# Patient Record
Sex: MALE | URBAN - METROPOLITAN AREA
[De-identification: names, ages, dates, MRNs, and addresses within clinical notes are randomized per-mention and may not be internally consistent; named-entity substitution may affect disease eponyms.]

---

## 2023-12-08 ENCOUNTER — TELEPHONE (OUTPATIENT)
Age: 33
End: 2023-12-08

## 2024-02-20 ENCOUNTER — TELEPHONE (OUTPATIENT)
Dept: FAMILY MEDICINE CLINIC | Facility: CLINIC | Age: 34
End: 2024-02-20

## 2024-02-26 ENCOUNTER — OFFICE VISIT (OUTPATIENT)
Dept: LAB | Facility: HOSPITAL | Age: 34
End: 2024-02-26
Payer: COMMERCIAL

## 2024-02-26 ENCOUNTER — HOSPITAL ENCOUNTER (OUTPATIENT)
Dept: RADIOLOGY | Facility: HOSPITAL | Age: 34
Discharge: HOME | End: 2024-02-26
Payer: COMMERCIAL

## 2024-02-26 DIAGNOSIS — Z01.818 OTHER SPECIFIED PRE-OPERATIVE EXAMINATION: ICD-10-CM

## 2024-02-26 PROCEDURE — 71046 X-RAY EXAM CHEST 2 VIEWS: CPT

## 2024-02-26 PROCEDURE — 93005 ELECTROCARDIOGRAM TRACING: CPT

## 2024-02-27 LAB
ATRIAL RATE: 56 BPM
P AXIS: 33 DEGREES
PR INTERVAL: 160 MS
QRS AXIS: 44 DEGREES
QRSD INTERVAL: 96 MS
QT INTERVAL: 402 MS
QTC INTERVAL: 387 MS
T WAVE AXIS: 26 DEGREES
VENTRICULAR RATE: 56 BPM

## 2024-02-27 PROCEDURE — 93010 ELECTROCARDIOGRAM REPORT: CPT | Performed by: INTERNAL MEDICINE

## 2024-03-12 ENCOUNTER — OFFICE VISIT (OUTPATIENT)
Age: 34
End: 2024-03-12

## 2024-03-12 VITALS
HEIGHT: 69 IN | BODY MASS INDEX: 25.3 KG/M2 | DIASTOLIC BLOOD PRESSURE: 64 MMHG | WEIGHT: 170.8 LBS | HEART RATE: 59 BPM | SYSTOLIC BLOOD PRESSURE: 112 MMHG | TEMPERATURE: 98.1 F | OXYGEN SATURATION: 99 %

## 2024-03-12 DIAGNOSIS — M54.17 LUMBOSACRAL RADICULOPATHY: Chronic | ICD-10-CM

## 2024-03-12 DIAGNOSIS — Z01.818 PREOPERATIVE CLEARANCE: Primary | ICD-10-CM

## 2024-03-12 PROCEDURE — 99213 OFFICE O/P EST LOW 20 MIN: CPT | Performed by: FAMILY MEDICINE

## 2024-03-12 NOTE — PROGRESS NOTES
"PRE-OPERATIVE EXAMINATION  Grabiel Fowler  1990    Grabiel Fowler is a 34 y.o. male with lumbosacral radiculopathy who is planning to undergo \"transforaminal lumbar interbody fusion L5-S1 with placement of cage bone grafting pedicle screws placement discectomy right to left laminotomy facetectomy and all necessary surgeries\" under general by Dr. Nathan [facility] on 3/25/2024.  Patient has not undergone anesthesia in the past to his knowledge.     Pt reports low back pain particularly with activity, not currently having back pain at rest. Reports it all started back in 2022 on ED visit at [facility] and has been ongoing for 2 years. Is currently still working until surgery.    Pt has no other known PMH or PSH.     ROS:   Chest pain: no   Shortness of breath: no  Shortness of breath with exertion: no  Orthopnea: no  Dizziness: no  Unexplained weight change: no    PMH:  CAD: no  HTN: no  CKD: no  DM: no on insulin: no  History of CVA: no     reports that he has never smoked. He has never used smokeless tobacco. He reports current alcohol use. He reports that he does not currently use drugs.    /64 (BP Location: Left arm, Patient Position: Sitting, Cuff Size: Standard)   Pulse 59   Temp 98.1 °F (36.7 °C) (Tympanic)   Ht 5' 9\" (1.753 m)   Wt 77.5 kg (170 lb 12.8 oz)   SpO2 99%   BMI 25.22 kg/m²     Physical Exam  Vitals reviewed.   Constitutional:       General: He is not in acute distress.     Appearance: Normal appearance. He is well-developed.   HENT:      Head: Normocephalic and atraumatic.      Right Ear: External ear normal.      Left Ear: External ear normal.      Nose: Nose normal.      Mouth/Throat:      Mouth: Mucous membranes are moist.      Pharynx: Oropharynx is clear.   Eyes:      Extraocular Movements: Extraocular movements intact.      Conjunctiva/sclera: Conjunctivae normal.   Cardiovascular:      Rate and Rhythm: Regular rhythm. Bradycardia present.      " "Pulses: Normal pulses.      Heart sounds: Normal heart sounds. No murmur heard.     Comments: Sinus bradycardia  Pulmonary:      Effort: Pulmonary effort is normal. No respiratory distress.      Breath sounds: Normal breath sounds. No wheezing or rales.   Abdominal:      Palpations: Abdomen is soft.   Musculoskeletal:         General: No swelling.      Cervical back: Neck supple.   Skin:     General: Skin is warm and dry.      Capillary Refill: Capillary refill takes less than 2 seconds.      Findings: No rash.   Neurological:      General: No focal deficit present.      Mental Status: He is alert. Mental status is at baseline.   Psychiatric:         Mood and Affect: Mood normal.         Behavior: Behavior normal.         Revised Cardiac Risk Index (RCRI) for Pre-Operative Risk   (estimates risk of cardiac complications after noncardiac surgery)    High-risk surgery: No 0 / Yes +1  Intraperitoneal, intrathoracic, suprainguinal vascular  History of ischemic heart disease: No 0 / Yes +1  Hx of MI, (+) exercise test, current chest pain considered due to myocardial ischemia, use of nitrate therapy or ECG with pathological Q waves)  History of CHF: No 0 / Yes +1  Pulmonary edema, B/L rales or S3 gallop; YIN, orthopnea, PND, CXR showing pulmonary vascular redistribution)  History of cerebrovascular disease: No 0 / Yes +1  Prior TIA or stroke  Pre-operative treatment with insulin: No 0 / Yes +1  Pre-operative creatinine >2 mg/dL: No 0 / Yes +1    RCRI Scorin points: Class I Risk, 0.4% Risk of Major Cardiac Event  1 point: Class II Risk, 0.9% Risk of Major Cardiac Event  2 points: Class III Risk, 6.6% Risk of Major Cardiac Event  3 points: Class IV Risk, 11% Risk of Major Cardiac Event  4 points: Class IV Risk, 11% Risk of Major Cardiac Event  5 points: Class IV Risk, 11% Risk of Major Cardiac Event  6 points: Class IV Risk, 11% Risk of Major Cardiac Event    No results found for: \"CREATININE\"    Assessment/Plan "   Grabiel was seen today for establish care.    Diagnoses and all orders for this visit:    Preoperative clearance    Lumbosacral radiculopathy      Lumbosacral radiculopathy  Undergoing surgery with Dr. Marmolejo on 3/25/2024 presenting for preop clearance. Labwork ,EKG, CXR previously completed reviewed. EKG with sinus bradycardia and likely artifact, would not prevent pt from undergoing surgery.   Pt is medically optimized for procedure at this time and can proceed without further work up  Recommend follow up after surgery for annual physical       Recommendations:  Grabiel Fowler is undergoing an elective Moderate Risk surgery. He is RCRI class I risk (0 points) with 0.4% risk for major adverse cardiac event (MACE). He may proceed with surgery as planned without further workup. Pre-operative form completed and faxed today to office as requested. Please print note and labwork/testing including EKG and CXR reads if office requests.     Pt is medically optimized to proceed with surgery.       Nkechi Bansal MD  [facility]  Date: 3/12/2024 Time: 9:17 AM

## 2024-03-12 NOTE — Clinical Note
Form completed and placed in white team completed folder. Please also print and fax today's preop clearance note and labwork results from 2/23/24 if surgery office requests. Thank you

## 2024-03-12 NOTE — ASSESSMENT & PLAN NOTE
Undergoing surgery with Dr. Marmolejo on 3/25/2024 presenting for preop clearance. Labwork, EKG, CXR previously completed reviewed. Labwork including CBC, UA, CMP, PT/PTT WNL and CXR unremarkable. EKG with sinus bradycardia and likely artifact, would not prevent pt from undergoing surgery.   Pt is medically optimized for procedure at this time and can proceed without further work up  Recommend follow up after surgery for annual physical

## 2024-03-12 NOTE — PATIENT INSTRUCTIONS
Lumbar Radiculopathy   AMBULATORY CARE:   Lumbar radiculopathy  is a painful condition that happens when a nerve in your lumbar spine (lower back) is pinched or irritated.       Common signs and symptoms of lumbar radiculopathy:   Pain that moves from your lower back to your buttocks, groin, and the back of your leg    Pain felt below your knee    Pain that worsens when you cough, sneeze, stand, or sit    Numbness, weakness, or tingling in your back or legs    Seek immediate care if:   You have a fever higher than 100.4°F (38°C) for longer than 2 days.    You have new, severe back or leg pain, or your pain spreads to both legs.    You have any new signs of numbness or weakness, especially in your lower back, legs, arms, or genital area.    You have new trouble controlling your urine and bowel movements.    You do not feel like your bladder empties when you urinate.    Call your doctor or spine specialist if:   Your pain does not improve within 1 to 3 weeks of treatment.    Your pain and weakness keep you from your normal activities at work, home, or school.    You lose more than 10 pounds in 6 months without trying.    You become depressed or sad because of the pain.    You have questions or concerns about your condition or care.    Treatment:  Ask your healthcare provider for more information about these and other treatments for lumbar radiculopathy:  Medicines:      NSAIDs , such as ibuprofen, help decrease swelling, pain, and fever. This medicine is available with or without a doctor's order. NSAIDs can cause stomach bleeding or kidney problems in certain people. If you take blood thinner medicine, always ask your healthcare provider if NSAIDs are safe for you. Always read the medicine label and follow directions.    Muscle relaxers  help decrease pain and muscle spasms.    Prescription pain medicine  may be given. Ask your healthcare provider how to take this medicine safely. Some prescription pain medicines  contain acetaminophen. Do not take other medicines that contain acetaminophen without talking to your healthcare provider. Too much acetaminophen may cause liver damage. Prescription pain medicine may cause constipation. Ask your healthcare provider how to prevent or treat constipation.     Steroid pills  may help reduce swelling and pain.    Steroid injections  into your lumbar spine may help decrease your nerve pain and swelling. You may need more than 1 injection if your symptoms do not improve after the first treatment.    Physical therapy  may be used to improve your posture (the way you stand and sit), flexibility, and the strength in your lower back. Your physical therapist may also teach you how to remain safely active and prevent more injury.    Transcutaneous electrical nerve stimulation (TENS)  stimulates nerves and may decrease your pain. Wires are attached to pads. The pads are attached to your skin. The wires send a mild current through your nerves.    Surgery  may relieve your pinched nerve if your condition has not improved within 4 to 6 weeks. You may also need surgery if you have lumbar radiculopathy more than 1 time.    Physical therapy  may help improve your posture (the way you stand and sit), flexibility, and the strength in your lower back. Your physical therapist may also teach you how to remain safely active and prevent more injury.  Manage or prevent lumbar radiculopathy:   Apply ice or heat.  Ice and heat can help relieve pain or swelling. Put ice in a plastic bag covered with a towel on your low back. Apply ice for 15 to 20 minutes at a time, or as directed. Cover heated items with a towel to avoid burns. You can also use a heating pad on a low setting. Apply heat for 20 minutes at a time. Your provider may tell you to alternate ice and heat.    Stay active.  Your healthcare provider may tell you to take walks to ease yourself back into your daily routine. Avoid long periods of bed rest.  Bed rest could worsen your symptoms. Do not move in ways that increase your pain. Ask your healthcare provider for more information about the best ways to stay active.         Do not lift anything heavy.  Heavy objects put a strain on your back and may make your symptoms worse.    Maintain a healthy weight.  Extra body weight may strain your back. Ask your provider what a healthy weight is for you. Your provider can help you create a safe weight loss plan, if needed.    Do not smoke.  Nicotine and other chemicals in cigarettes and cigars increase your risk for lumbar radiculopathy. Ask your provider for information if you currently smoke and need help to quit. E-cigarettes and smokeless tobacco still contain nicotine. Talk to your healthcare provider before you use these products.    Follow up with your doctor or spine specialist within 1 to 3 weeks:  After your first follow-up appointment, follow up every 2 weeks until you have healed, or as directed. Write down your questions so you remember to ask them during your visits.  © Copyright [employer] 2023 Information is for End User's use only and may not be sold, redistributed or otherwise used for commercial purposes.  The above information is an  only. It is not intended as medical advice for individual conditions or treatments. Talk to your doctor, nurse or pharmacist before following any medical regimen to see if it is safe and effective for you.

## 2024-08-05 ENCOUNTER — HOSPITAL ENCOUNTER (OUTPATIENT)
Dept: RADIOLOGY | Facility: HOSPITAL | Age: 34
Discharge: HOME | End: 2024-08-05
Payer: COMMERCIAL

## 2024-08-05 DIAGNOSIS — M54.50 LOW BACK PAIN, UNSPECIFIED BACK PAIN LATERALITY, UNSPECIFIED CHRONICITY, UNSPECIFIED WHETHER SCIATICA PRESENT: ICD-10-CM

## 2024-08-05 DIAGNOSIS — M54.9 SPINE PAIN: ICD-10-CM

## 2024-08-05 DIAGNOSIS — Z98.1 S/P LUMBAR FUSION: ICD-10-CM

## 2024-08-05 PROCEDURE — 72120 X-RAY BEND ONLY L-S SPINE: CPT

## 2024-08-28 ENCOUNTER — APPOINTMENT (OUTPATIENT)
Dept: LAB | Facility: HOSPITAL | Age: 34
End: 2024-08-28
Payer: COMMERCIAL

## 2024-08-28 ENCOUNTER — HOSPITAL ENCOUNTER (OUTPATIENT)
Dept: RADIOLOGY | Facility: HOSPITAL | Age: 34
Discharge: HOME | End: 2024-08-28
Payer: COMMERCIAL

## 2024-08-28 DIAGNOSIS — M54.16 LUMBAR RADICULOPATHY: ICD-10-CM

## 2024-08-28 DIAGNOSIS — M54.50 LOW BACK PAIN, UNSPECIFIED BACK PAIN LATERALITY, UNSPECIFIED CHRONICITY, UNSPECIFIED WHETHER SCIATICA PRESENT: ICD-10-CM

## 2024-08-28 DIAGNOSIS — M48.062 PSEUDOCLAUDICATION SYNDROME: ICD-10-CM

## 2024-08-28 LAB
ALBUMIN SERPL BCG-MCNC: 4.6 G/DL (ref 3.5–5)
ALP SERPL-CCNC: 61 U/L (ref 34–104)
ALT SERPL W P-5'-P-CCNC: 26 U/L (ref 7–52)
ANION GAP SERPL CALCULATED.3IONS-SCNC: 5 MMOL/L (ref 4–13)
APTT PPP: 27 SECONDS (ref 23–34)
AST SERPL W P-5'-P-CCNC: 16 U/L (ref 13–39)
ATRIAL RATE: 63 BPM
BACTERIA UR QL AUTO: ABNORMAL /HPF
BASOPHILS # BLD AUTO: 0.04 THOUSANDS/ÂΜL (ref 0–0.1)
BASOPHILS NFR BLD AUTO: 1 % (ref 0–1)
BILIRUB SERPL-MCNC: 1.23 MG/DL (ref 0.2–1)
BILIRUB UR QL STRIP: NEGATIVE
BUN SERPL-MCNC: 18 MG/DL (ref 5–25)
CALCIUM SERPL-MCNC: 9.6 MG/DL (ref 8.4–10.2)
CHLORIDE SERPL-SCNC: 105 MMOL/L (ref 96–108)
CLARITY UR: CLEAR
CO2 SERPL-SCNC: 28 MMOL/L (ref 21–32)
COLOR UR: YELLOW
CREAT SERPL-MCNC: 0.96 MG/DL (ref 0.6–1.3)
EOSINOPHIL # BLD AUTO: 0.38 THOUSAND/ÂΜL (ref 0–0.61)
EOSINOPHIL NFR BLD AUTO: 6 % (ref 0–6)
ERYTHROCYTE [DISTWIDTH] IN BLOOD BY AUTOMATED COUNT: 12.5 % (ref 11.6–15.1)
GFR SERPL CREATININE-BSD FRML MDRD: 102 ML/MIN/1.73SQ M
GLUCOSE SERPL-MCNC: 88 MG/DL (ref 65–140)
GLUCOSE UR STRIP-MCNC: NEGATIVE MG/DL
HCT VFR BLD AUTO: 41 % (ref 36.5–49.3)
HGB BLD-MCNC: 14.1 G/DL (ref 12–17)
HGB UR QL STRIP.AUTO: NEGATIVE
IMM GRANULOCYTES # BLD AUTO: 0.02 THOUSAND/UL (ref 0–0.2)
IMM GRANULOCYTES NFR BLD AUTO: 0 % (ref 0–2)
INR PPP: 0.98 (ref 0.85–1.19)
KETONES UR STRIP-MCNC: NEGATIVE MG/DL
LEUKOCYTE ESTERASE UR QL STRIP: NEGATIVE
LYMPHOCYTES # BLD AUTO: 2.39 THOUSANDS/ÂΜL (ref 0.6–4.47)
LYMPHOCYTES NFR BLD AUTO: 36 % (ref 14–44)
MCH RBC QN AUTO: 29.7 PG (ref 26.8–34.3)
MCHC RBC AUTO-ENTMCNC: 34.4 G/DL (ref 31.4–37.4)
MCV RBC AUTO: 87 FL (ref 82–98)
MONOCYTES # BLD AUTO: 0.57 THOUSAND/ÂΜL (ref 0.17–1.22)
MONOCYTES NFR BLD AUTO: 9 % (ref 4–12)
NEUTROPHILS # BLD AUTO: 3.34 THOUSANDS/ÂΜL (ref 1.85–7.62)
NEUTS SEG NFR BLD AUTO: 48 % (ref 43–75)
NITRITE UR QL STRIP: NEGATIVE
NON-SQ EPI CELLS URNS QL MICRO: ABNORMAL /HPF
NRBC BLD AUTO-RTO: 0 /100 WBCS
P AXIS: 27 DEGREES
PH UR STRIP.AUTO: 5.5 [PH]
PLATELET # BLD AUTO: 292 THOUSANDS/UL (ref 149–390)
PMV BLD AUTO: 9.5 FL (ref 8.9–12.7)
POTASSIUM SERPL-SCNC: 4.1 MMOL/L (ref 3.5–5.3)
PR INTERVAL: 156 MS
PROT SERPL-MCNC: 7.4 G/DL (ref 6.4–8.4)
PROT UR STRIP-MCNC: NEGATIVE MG/DL
PROTHROMBIN TIME: 13.5 SECONDS (ref 12.3–15)
QRS AXIS: 7 DEGREES
QRSD INTERVAL: 90 MS
QT INTERVAL: 376 MS
QTC INTERVAL: 384 MS
RBC # BLD AUTO: 4.74 MILLION/UL (ref 3.88–5.62)
RBC #/AREA URNS AUTO: ABNORMAL /HPF
SODIUM SERPL-SCNC: 138 MMOL/L (ref 135–147)
SP GR UR STRIP.AUTO: 1.02 (ref 1–1.03)
T WAVE AXIS: 6 DEGREES
UROBILINOGEN UR STRIP-ACNC: <2 MG/DL
VENTRICULAR RATE: 63 BPM
WBC # BLD AUTO: 6.74 THOUSAND/UL (ref 4.31–10.16)
WBC #/AREA URNS AUTO: ABNORMAL /HPF

## 2024-08-28 PROCEDURE — 85730 THROMBOPLASTIN TIME PARTIAL: CPT

## 2024-08-28 PROCEDURE — 93010 ELECTROCARDIOGRAM REPORT: CPT | Performed by: INTERNAL MEDICINE

## 2024-08-28 PROCEDURE — 81001 URINALYSIS AUTO W/SCOPE: CPT

## 2024-08-28 PROCEDURE — 71046 X-RAY EXAM CHEST 2 VIEWS: CPT

## 2024-08-28 PROCEDURE — 85610 PROTHROMBIN TIME: CPT

## 2024-08-28 PROCEDURE — 85025 COMPLETE CBC W/AUTO DIFF WBC: CPT

## 2024-08-28 PROCEDURE — 36415 COLL VENOUS BLD VENIPUNCTURE: CPT

## 2024-08-28 PROCEDURE — 80053 COMPREHEN METABOLIC PANEL: CPT

## 2024-08-28 PROCEDURE — 93005 ELECTROCARDIOGRAM TRACING: CPT

## 2024-09-09 NOTE — PROGRESS NOTES
[facility] PRE-OPERATIVE EVALUATION  [facility] PHYSICIAN GROUP - [facility]    NAME: Grabiel Fowler  AGE: 34 y.o. SEX: male  : 1990     DATE: 9/10/2024    [facility] Pre-Operative Evaluation      Chief Complaint: Pre-operative Evaluation     Surgery: revision of hardware L5-S1 with replacement of pedicle screws, rods and all necessary surgeries.   Anticipated Date of Surgery: 24  Referring Provider: Self, Referral       History of Present Illness:     Grabiel Fowler is a 34 y.o. male who presents to the office today for a preoperative consultation at the request of surgeon, Ezequiel Chang M.D. , who plans on performing revision of hardware L5-S1 with replacement of pedicle screws, rods and all necessary surgeries. on 2024. Planned anesthesia is general. Patient has a bleeding risk of: no recent abnormal bleeding. Patient does not have objections to receiving blood products if needed. Current anti-platelet/anti-coagulation medications that the patient is prescribed includes:  None . He is not taking any OTC medications for pain. He does take a daily mutivitamin.     He is a  and had an injury that continued to get worse. He had a surgery in 2024. After that surgery his sciatic improved, however his surgical pain was taking longer to resolve. Imaging in 2024 showed that one of the screws were loose and he needs to go for revision of surgery.          Assessment of Chronic Conditions:   - No chronic medical conditions.      Assessment of Cardiac Risk:  Denies unstable or severe angina or MI in the last 6 weeks or history of stent placement in the last year   Denies decompensated heart failure (e.g. New onset heart failure, NYHA functional class IV heart failure, or worsening existing heart failure)  Denies significant arrhythmias such as high grade AV block, symptomatic ventricular arrhythmia, newly recognized ventricular  tachycardia, supraventricular tachycardia with resting heart rate >100, or symptomatic bradycardia  Denies severe heart valve disease including aortic stenosis or symptomatic mitral stenosis     Exercise Capacity:  Able to walk 4 blocks without symptoms?: Yes  Able to walk 2 flights without symptoms?: Yes    Prior Anesthesia Reactions: No, though did take a while to come out of anesthesia last surgery, and required an over night admission.      Personal history of venous thromboembolic disease? No    History of steroid use for >2 weeks within last year? No         Review of Systems:     Review of Systems   Constitutional:  Negative for activity change, appetite change, chills, fatigue and fever.   HENT:  Negative for congestion and sore throat.    Eyes:  Negative for visual disturbance.   Respiratory:  Negative for cough, shortness of breath and wheezing.    Cardiovascular:  Negative for chest pain, palpitations and leg swelling.   Gastrointestinal:  Negative for abdominal pain, constipation, diarrhea, nausea and vomiting.   Genitourinary:  Negative for difficulty urinating, dysuria and hematuria.   Musculoskeletal:  Positive for back pain. Negative for arthralgias.   Skin:  Negative for color change and rash.   Neurological:  Negative for dizziness, seizures, syncope, weakness, light-headedness, numbness and headaches.   Psychiatric/Behavioral:  Positive for dysphoric mood. Negative for agitation, behavioral problems, self-injury, sleep disturbance and suicidal ideas. The patient is not nervous/anxious and is not hyperactive.    All other systems reviewed and are negative.      Current Problem List:     Patient Active Problem List   Diagnosis    Lumbosacral radiculopathy    Adjustment disorder with depressed mood       Allergies:     No Known Allergies    Current Medications:       Current Outpatient Medications:     multivitamin (THERAGRAN) TABS, Take 1 tablet by mouth daily, Disp: , Rfl:     Past Medical History:  "      History reviewed. No pertinent past medical history.     Past Surgical History:   Procedure Laterality Date    SPINE SURGERY  03/2024        Family History   Problem Relation Age of Onset    Diabetes type II Father         Social History     Socioeconomic History    Marital status: Single     Spouse name: Not on file    Number of children: Not on file    Years of education: Not on file    Highest education level: Not on file   Occupational History    Not on file   Tobacco Use    Smoking status: Never    Smokeless tobacco: Never   Vaping Use    Vaping status: Never Used   Substance and Sexual Activity    Alcohol use: Yes     Comment: socially    Drug use: Not Currently    Sexual activity: Not on file   Other Topics Concern    Not on file   Social History Narrative    Not on file     Social Determinants of Health     Financial Resource Strain: Low Risk  (3/12/2024)    Overall Financial Resource Strain (CARDIA)     Difficulty of Paying Living Expenses: Not very hard   Food Insecurity: No Food Insecurity (3/12/2024)    Hunger Vital Sign     Worried About Running Out of Food in the Last Year: Never true     Ran Out of Food in the Last Year: Never true   Transportation Needs: No Transportation Needs (3/12/2024)    PRAPARE - Transportation     Lack of Transportation (Medical): No     Lack of Transportation (Non-Medical): No   Physical Activity: Not on file   Stress: Not on file   Social Connections: Not on file   Intimate Partner Violence: Not on file   Housing Stability: Low Risk  (3/12/2024)    Housing Stability Vital Sign     Unable to Pay for Housing in the Last Year: No     Number of Times Moved in the Last Year: 1     Homeless in the Last Year: No        Physical Exam:     /86 (BP Location: Left arm, Patient Position: Sitting, Cuff Size: Standard)   Pulse 70   Temp 97.9 °F (36.6 °C) (Tympanic)   Ht 5' 9\" (1.753 m)   Wt 83.1 kg (183 lb 4.8 oz)   SpO2 100%   BMI 27.07 kg/m²     Physical Exam  Vitals " and nursing note reviewed.   Constitutional:       General: He is not in acute distress.     Appearance: Normal appearance. He is well-developed.   HENT:      Head: Normocephalic and atraumatic.      Right Ear: External ear normal.      Left Ear: External ear normal.      Nose: No congestion.      Mouth/Throat:      Mouth: Mucous membranes are moist.      Pharynx: Oropharynx is clear. No posterior oropharyngeal erythema or postnasal drip.      Tonsils: No tonsillar exudate or tonsillar abscesses. 2+ on the right. 2+ on the left.   Eyes:      Extraocular Movements: Extraocular movements intact and EOM normal.      Conjunctiva/sclera: Conjunctivae normal.   Cardiovascular:      Rate and Rhythm: Normal rate and regular rhythm.      Pulses: Normal pulses.      Heart sounds: Normal heart sounds. No murmur heard.  Pulmonary:      Effort: Pulmonary effort is normal. No respiratory distress.      Breath sounds: Normal breath sounds. No wheezing, rhonchi or rales.   Abdominal:      General: There is no distension.      Palpations: Abdomen is soft.   Musculoskeletal:         General: Normal range of motion.      Cervical back: Normal, normal range of motion and neck supple.      Thoracic back: Normal.      Lumbar back: Tenderness (left lumbrosacral region had mild tenderness on palpation just inferior to distal edge of scar) present. No bony tenderness.      Right lower leg: No edema.      Left lower leg: No edema.   Skin:     General: Skin is warm and dry.      Capillary Refill: Capillary refill takes less than 2 seconds.      Comments: Vertical 3 inch scar well healed on left lateral paraspinal lumbar region.    Neurological:      Mental Status: He is alert and oriented to person, place, and time.      Motor: No weakness.      Gait: Gait normal.   Psychiatric:         Mood and Affect: Mood and affect and mood normal.         Behavior: Behavior normal.          Data:     Pre-operative work-up    Laboratory Results: I have  personally reviewed the pertinent laboratory results/reports      EKG/Chest x-ray:: Personally reviewed the following image studies in PACS and associated radiology reports: chest xray and xray(s). My interpretation of the radiology images/reports is: wnl for the cxr, lumbar spine xray shows abnormality of superior hardware. EKG reviewed and wnl. Shows NSR with no ST or T wave abnormalities.              Assessment & Recommendations:     Problem List Items Addressed This Visit          Nervous and Auditory    Lumbosacral radiculopathy - Primary (Chronic)     Revision surgery next week  All labs, EKG, imaging reviewed  No special requests or requirements for planned surgery  Patient is low risk for surgical complications and medically optimized to proceed with planned surgery at this time.            Behavioral Health    Adjustment disorder with depressed mood     PHQ 2 was a 2, however patient does show clinical signs of adjustment disorder with depressed mood.  Since his surgery he has not been working for about 6 months.  He feels a little down and depressed a few days of the week.  He denies any suicidal ideation, self-harm, homicidal ideation.  Patient is not a candidate for medication at this time as he is going for next week, and would like to avoid starting any new medications.  Though we did discuss this is a possible option.  Do recommend patient to set up outpatient behavioral health therapy.  Referral placed to social work.  Will have patient follow-up in about 6 to 8 weeks after his surgery to see how he is adjusting, and we can provide additional supports in relation to his adjustment disorder.         Relevant Orders    Ambulatory Referral to Social Work Care Management Program       Pre-Op Evaluation Assessment  34 y.o. male with planned surgery: Revision of hardware L5-S1 with replacement of pedicle screws, rods and all necessary surgeries..    Known risk factors for perioperative complications:  None.        Current medications which may produce withdrawal symptoms if withheld perioperatively: None.    Pre-Op Evaluation Plan  1. Further preoperative workup as follows:   - None; no further preoperative work-up is required    2. Medication Management/Recommendations:   - None, continue medication regimen including morning of surgery, with sip of water    3. Prophylaxis for cardiac events with perioperative beta-blockers: not indicated.    4. Patient requires further consultation with: None    Clearance  Patient is CLEARED for surgery without any additional cardiac testing.     Rylee Mullen MD  [address] [address]  Phone#  [phone]  Fax#  [phone]

## 2024-09-10 ENCOUNTER — OFFICE VISIT (OUTPATIENT)
Age: 34
End: 2024-09-10

## 2024-09-10 ENCOUNTER — TELEPHONE (OUTPATIENT)
Age: 34
End: 2024-09-10

## 2024-09-10 VITALS
HEIGHT: 69 IN | SYSTOLIC BLOOD PRESSURE: 140 MMHG | DIASTOLIC BLOOD PRESSURE: 86 MMHG | WEIGHT: 183.3 LBS | OXYGEN SATURATION: 100 % | HEART RATE: 70 BPM | BODY MASS INDEX: 27.15 KG/M2 | TEMPERATURE: 97.9 F

## 2024-09-10 DIAGNOSIS — M54.17 LUMBOSACRAL RADICULOPATHY: Primary | Chronic | ICD-10-CM

## 2024-09-10 DIAGNOSIS — F43.21 ADJUSTMENT DISORDER WITH DEPRESSED MOOD: ICD-10-CM

## 2024-09-10 PROCEDURE — 99214 OFFICE O/P EST MOD 30 MIN: CPT | Performed by: FAMILY MEDICINE

## 2024-09-10 RX ORDER — DIPHENOXYLATE HYDROCHLORIDE AND ATROPINE SULFATE 2.5; .025 MG/1; MG/1
1 TABLET ORAL DAILY
COMMUNITY

## 2024-09-10 NOTE — TELEPHONE ENCOUNTER
A user error has taken place: encounter opened in error, closed for administrative reasons.     Printed out office note, labs, EKG and chest x ray  Faxed to office

## 2024-09-10 NOTE — ASSESSMENT & PLAN NOTE
Revision surgery next week  All labs, EKG, imaging reviewed  No special requests or requirements for planned surgery  Patient is low risk for surgical complications and medically optimized to proceed with planned surgery at this time.

## 2024-09-10 NOTE — TELEPHONE ENCOUNTER
----- Message from Rylee Mullen MD sent at 9/10/2024  8:53 AM EDT -----  Regarding: pre op clearance paperwork  Good morning,  Patient was seen in the office today for preop clearance.  I placed a copy of the form from the surgeons office and the blue team folder.  Can you please fax this along with the note from today, his recent blood work from 8/28, EKG results, and chest x-ray to the surgery's office.  Me know if you have any questions.    Kind regards,    Dr. Mullen

## 2024-09-10 NOTE — ASSESSMENT & PLAN NOTE
PHQ 2 was a 2, however patient does show clinical signs of adjustment disorder with depressed mood.  Since his surgery he has not been working for about 6 months.  He feels a little down and depressed a few days of the week.  He denies any suicidal ideation, self-harm, homicidal ideation.  Patient is not a candidate for medication at this time as he is going for next week, and would like to avoid starting any new medications.  Though we did discuss this is a possible option.  Do recommend patient to set up outpatient behavioral health therapy.  Referral placed to social work.  Will have patient follow-up in about 6 to 8 weeks after his surgery to see how he is adjusting, and we can provide additional supports in relation to his adjustment disorder.

## 2024-09-16 ENCOUNTER — PATIENT OUTREACH (OUTPATIENT)
Age: 34
End: 2024-09-16

## 2024-09-16 NOTE — PROGRESS NOTES
OP TERRI had received a referral from Rylee Mullen MD r/t . OP TERRI had completed a chart review. Per chart, patient had surgery and has not been working for 6 months. Per chart, patient feeling down and depressed. Per chart, patient needs MH services.    OP TERRI had called the patient via phone. OP SW left a voicemail. OP TERRI had received a call back from the patient. OP SW will attempt to call again at a late date and time. OP TERRI will continue to be available.

## 2024-09-19 ENCOUNTER — PATIENT OUTREACH (OUTPATIENT)
Age: 34
End: 2024-09-19

## 2024-09-19 NOTE — PROGRESS NOTES
OP TERRI had called the patient via phone. OP TERRI left a voicemail. OP TERRI notes this is the second phone call attempt. OP TERRI sent unable to reach letter via P21t. OP TERRI closed referral. Please reconsult TERRI for future needs.

## 2024-09-19 NOTE — LETTER
09/19/24    Dear Grabiel Folwer,    I tried to reach you by phone and was unfortunately unable to reach you. I am the Outpatient Care Manager -  from [facility]. I am following up on a referral placed by your PCP. Please give me a call at [phone] from 8am-4:30pm, Mon-Fri.    Sincerely,         NENO Sanchez

## 2024-11-12 ENCOUNTER — OFFICE VISIT (OUTPATIENT)
Age: 34
End: 2024-11-12

## 2024-11-12 VITALS
OXYGEN SATURATION: 98 % | HEART RATE: 75 BPM | BODY MASS INDEX: 28.29 KG/M2 | TEMPERATURE: 97.7 F | DIASTOLIC BLOOD PRESSURE: 80 MMHG | WEIGHT: 191 LBS | HEIGHT: 69 IN | SYSTOLIC BLOOD PRESSURE: 124 MMHG

## 2024-11-12 DIAGNOSIS — Z23 ENCOUNTER FOR IMMUNIZATION: ICD-10-CM

## 2024-11-12 DIAGNOSIS — F43.21 ADJUSTMENT DISORDER WITH DEPRESSED MOOD: Primary | ICD-10-CM

## 2024-11-12 DIAGNOSIS — M54.17 LUMBOSACRAL RADICULOPATHY: Chronic | ICD-10-CM

## 2024-11-12 PROCEDURE — 99214 OFFICE O/P EST MOD 30 MIN: CPT | Performed by: FAMILY MEDICINE

## 2024-11-12 NOTE — ASSESSMENT & PLAN NOTE
Last visit showed clinical signs of adjustment disorder with depressed mood.  Secondary to back injury requiring surgery and time off of work.   Has since had his second back surgery and is feeling much better. Declines any signs or symptoms of depressed mood at this time. No longer requires BHT.  He denies any suicidal ideation, self-harm, homicidal ideation.

## 2024-11-12 NOTE — ASSESSMENT & PLAN NOTE
Revision surgery went well and reports symptoms have resolved. Has apt with vikas on 11/20/24 to discuss starting physical therapy. He will be off of work for 3 more months.

## 2024-11-12 NOTE — PROGRESS NOTES
Ambulatory Visit  Name: Grabiel Fowler      : 1990      MRN: [id number]  Encounter Provider: Rylee Mullen MD  Encounter Date: 2024   Encounter department: [facility]    Assessment & Plan  Adjustment disorder with depressed mood  Last visit showed clinical signs of adjustment disorder with depressed mood.  Secondary to back injury requiring surgery and time off of work.   Has since had his second back surgery and is feeling much better. Declines any signs or symptoms of depressed mood at this time. No longer requires BHT.  He denies any suicidal ideation, self-harm, homicidal ideation.           Lumbosacral radiculopathy  Revision surgery went well and reports symptoms have resolved. Has apt with vikas on 24 to discuss starting physical therapy. He will be off of work for 3 more months.          Encounter for immunization  Declined           History of Present Illness     This is a very pleasant 34 y.o. male who presents to the clinic for management of adjustment disorder secondary to physical condition.   Patient's medical conditions are stable unless noted otherwise above.  Patient has not had any recent hospitalizations, or medical emergencies since last visit.  Patient has no further complaints other than what is mentioned in the ROS.              Review of Systems   Constitutional:  Negative for activity change, appetite change, chills, fatigue and fever.   HENT:  Negative for congestion and sore throat.    Eyes:  Negative for visual disturbance.   Respiratory:  Negative for cough, shortness of breath and wheezing.    Cardiovascular:  Negative for chest pain, palpitations and leg swelling.   Gastrointestinal:  Negative for abdominal pain, constipation, diarrhea, nausea and vomiting.   Genitourinary:  Negative for difficulty urinating, dysuria and hematuria.   Musculoskeletal:  Negative for arthralgias and back pain.   Skin:  Negative  "for color change and rash.   Neurological:  Negative for dizziness, seizures, syncope, weakness, light-headedness, numbness and headaches.   Psychiatric/Behavioral:  Negative for agitation, behavioral problems, dysphoric mood, self-injury, sleep disturbance and suicidal ideas. The patient is not nervous/anxious and is not hyperactive.    All other systems reviewed and are negative.          Objective     /80 (BP Location: Left arm, Patient Position: Sitting, Cuff Size: Standard)   Pulse 75   Temp 97.7 °F (36.5 °C) (Tympanic)   Ht 5' 9\" (1.753 m)   Wt 86.6 kg (191 lb)   SpO2 98%   BMI 28.21 kg/m²     Physical Exam  Vitals reviewed.   Constitutional:       General: He is not in acute distress.     Appearance: Normal appearance. He is well-developed.   HENT:      Head: Normocephalic and atraumatic.      Right Ear: External ear normal.      Left Ear: External ear normal.      Nose: Nose normal.      Mouth/Throat:      Mouth: Mucous membranes are moist.      Pharynx: Oropharynx is clear.   Eyes:      Extraocular Movements: Extraocular movements intact.      Conjunctiva/sclera: Conjunctivae normal.   Cardiovascular:      Rate and Rhythm: Normal rate and regular rhythm.      Pulses: Normal pulses.      Heart sounds: Normal heart sounds. No murmur heard.  Pulmonary:      Effort: Pulmonary effort is normal. No respiratory distress.      Breath sounds: Normal breath sounds. No wheezing or rales.   Abdominal:      Palpations: Abdomen is soft.   Musculoskeletal:         General: No swelling.      Cervical back: Neck supple.   Skin:     General: Skin is warm and dry.      Capillary Refill: Capillary refill takes less than 2 seconds.      Findings: No rash.      Comments: Vertical 3 inch scar well healed on left lateral paraspinal lumbar region. Skin discoloration superior and inferior to scar that correlates with previous skin reaction to bandage post operatively.     Neurological:      General: No focal deficit " present.      Mental Status: He is alert. Mental status is at baseline.   Psychiatric:         Mood and Affect: Mood normal.         Behavior: Behavior normal.

## 2024-12-02 ENCOUNTER — EVALUATION (OUTPATIENT)
Dept: PHYSICAL THERAPY | Facility: CLINIC | Age: 34
End: 2024-12-02
Payer: COMMERCIAL

## 2024-12-02 DIAGNOSIS — Z98.1 S/P LUMBAR FUSION: Primary | ICD-10-CM

## 2024-12-02 PROCEDURE — 97161 PT EVAL LOW COMPLEX 20 MIN: CPT | Performed by: PHYSICAL THERAPIST

## 2024-12-02 NOTE — LETTER
2024    Nathan Marmolejo DO  [address] [address]    Patient: Grabiel Fowler   YOB: 1990   Date of Visit: 2024     Encounter Diagnosis     ICD-10-CM    1. S/P lumbar fusion  Z98.1           Dear Dr. Marmolejo:    Thank you for your recent referral of Grabiel Fowler. Please review the attached evaluation summary from Grabiel's recent visit.     Please verify that you agree with the plan of care by signing the attached order.     If you have any questions or concerns, please do not hesitate to call.     I sincerely appreciate the opportunity to share in the care of one of your patients and hope to have another opportunity to work with you in the near future.       Sincerely,    Maulik Mirza, PT      Referring Provider:      I certify that I have read the below Plan of Care and certify the need for these services furnished under this plan of treatment while under my care.                    Nathan Marmolejo DO  [address] [address]  Via Fax: [phone]          PT Evaluation     Today's date: 2024  Patient name: Grabiel Fowler  : 1990  MRN: [id number]  Referring provider: Nathan Marmolejo DO  Dx:   Encounter Diagnosis     ICD-10-CM    1. S/P lumbar fusion  Z98.1           Start Time: 1500  Stop Time: 1545  Total time in clinic (min): 45 minutes    Assessment  Impairments: abnormal muscle firing, abnormal muscle tone, abnormal or restricted ROM, abnormal movement, impaired physical strength, lacks appropriate home exercise program, pain with function, poor posture  and poor body mechanics  Symptom irritability: low    Assessment details: Grabiel Fowler is a 34 y.o. male who presents with complaints of S/P lumbar fusion  (primary encounter diagnosis).  No further referral appears necessary at this time based upon examination results.  Patient is presenting with decreased ROM and strength leading to limitations  with working as  and performing ADLs around the house. Prognosis is good given HEP compliance and PT 1-2x/wk.  Positive prognostic indicators include positive attitude toward recovery.   Please contact me if you have any questions or recommendations.  Thank you for the opportunity to share in Grabiel's care.       Understanding of Dx/Px/POC: good     Prognosis: good    Plan  Patient would benefit from: skilled physical therapy    Planned therapy interventions: joint mobilization, manual therapy, patient education, postural training, strengthening, stretching, therapeutic activities, therapeutic exercise, home exercise program, neuromuscular re-education, flexibility, functional ROM exercises, abdominal trunk stabilization and body mechanics training    Frequency: 1-2x week  Duration in weeks: 8  Treatment plan discussed with: patient        Subjective Evaluation    History of Present Illness  Date of surgery: 10/10/2024  Mechanism of injury: Patient presenting post op L5/S1 do to hardware failure. No longer having sciatic symptoms. Patient denies bowel/bladder dysfunction, saddle paraesthesias, but has cough/sneeze provocation in his back. Was recommended to start therapy and will be following up with physician later this month. Patient works as  and would like to return to work. Patient has been avoiding bending, twisting, and lifting.               Recurrent probem    Quality of life: good    Patient Goals  Patient goals for therapy: increased motion, decreased pain, increased strength and return to work    Pain  Current pain ratin  At worst pain ratin        Short Term:  Pt will report decreased levels of pain by at least 2 subjective ratings in 4 weeks  Pt will demonstrate improved ROM by at least 10 degrees in 4 weeks  Pt will demonstrate improved strength by 1/2 grade in 4 weeks.  Pt will be able to carry 10 pounds without issue in 4 weeks  Long Term:   Pt will be  independent in their HEP in 8 weeks  Pt will be pain free with IADL's in 8 weeks.  Pt will be able to demonstrate full AROM in 8 weeks  Pt will be able to carry 25 pounds without limitation in 8 weeks.     Objective  GAIT: WNL  Squat assess: WNL  Heel toe walk: + left foot drop    Lumbar  % of normal   Flex. 25   Extn. 25   SB Left 75   SB Right 75   ROT Left 50   ROT Right 50   Repetitive testing: held          MMT         AROM    Hip       L       R        L           R   Flex. 4 4 WNL wNL   Extn. 3 3     Abd. 3 3     IR. 4 4 WNL WNL   ER. 4 4 WNL WNL                 MMT    Knee         L        R   Flex. 4 4+   Extn. 4 4+              MMT    Ankle       L        R   PF 3 5   DF. 4 5   EHL 4 5   Inv. 5 5   Ever. 5 5     Posture: fair sitting posture  Dermatome: (pinprick- L/R):  intact  Reflexes:  (L/R) L4:   3+ B/L     S1:   2+ B/L     Clonus= 1 beat each  Slump test: L=  +   R=   -    Straight leg raise:   L=  -    R=    -          Transverse abdominis:  supine march=Fair     Hip/SI joint:   scour=  -     julio cesar = -     Active SLR= + LLE weakness       Insurance:  AMA/CMS Eval/ Re-eval POC expires Auth #/ Referral # Total    Start date  Expiration date Extension  Visit limitation?  PT only or  PT+OT? Co-Insurance   CMS 12.2.24 1.27.24 needed 7v    30 PCY used 23                    AUTH #:  Date 12.2              Authed: Used 1               Remaining  6                  Precautions: s/p L5-S1 fusion 10/10/24, standard precautions  Patient provided verbal consent to treatment plan and recommended interventions.    Manuals 12.2        visit 1                                   Neuro Re-Ed         B/L sciatic sliders 2*10        TrA BKFO 2*10                                                     Ther Ex         Calf raise 2*10        bridge 2*10                                                              Ther Activity         Pt ed FB

## 2024-12-02 NOTE — PROGRESS NOTES
PT Evaluation     Today's date: 2024  Patient name: Grabiel Fowler  : 1990  MRN: [id number]  Referring provider: Nathan Marmolejo DO  Dx:   Encounter Diagnosis     ICD-10-CM    1. S/P lumbar fusion  Z98.1           Start Time: 1500  Stop Time: 1545  Total time in clinic (min): 45 minutes    Assessment  Impairments: abnormal muscle firing, abnormal muscle tone, abnormal or restricted ROM, abnormal movement, impaired physical strength, lacks appropriate home exercise program, pain with function, poor posture  and poor body mechanics  Symptom irritability: low    Assessment details: Grabiel Fowler is a 34 y.o. male who presents with complaints of S/P lumbar fusion  (primary encounter diagnosis).  No further referral appears necessary at this time based upon examination results.  Patient is presenting with decreased ROM and strength leading to limitations with working as  and performing ADLs around the house. Prognosis is good given HEP compliance and PT 1-2x/wk.  Positive prognostic indicators include positive attitude toward recovery.   Please contact me if you have any questions or recommendations.  Thank you for the opportunity to share in Grabiel's care.       Understanding of Dx/Px/POC: good     Prognosis: good    Plan  Patient would benefit from: skilled physical therapy    Planned therapy interventions: joint mobilization, manual therapy, patient education, postural training, strengthening, stretching, therapeutic activities, therapeutic exercise, home exercise program, neuromuscular re-education, flexibility, functional ROM exercises, abdominal trunk stabilization and body mechanics training    Frequency: 1-2x week  Duration in weeks: 8  Treatment plan discussed with: patient        Subjective Evaluation    History of Present Illness  Date of surgery: 10/10/2024  Mechanism of injury: Patient presenting post op L5/S1 do to hardware failure. No longer having sciatic  symptoms. Patient denies bowel/bladder dysfunction, saddle paraesthesias, but has cough/sneeze provocation in his back. Was recommended to start therapy and will be following up with physician later this month. Patient works as  and would like to return to work. Patient has been avoiding bending, twisting, and lifting.               Recurrent probem    Quality of life: good    Patient Goals  Patient goals for therapy: increased motion, decreased pain, increased strength and return to work    Pain  Current pain ratin  At worst pain ratin        Short Term:  Pt will report decreased levels of pain by at least 2 subjective ratings in 4 weeks  Pt will demonstrate improved ROM by at least 10 degrees in 4 weeks  Pt will demonstrate improved strength by 1/2 grade in 4 weeks.  Pt will be able to carry 10 pounds without issue in 4 weeks  Long Term:   Pt will be independent in their HEP in 8 weeks  Pt will be pain free with IADL's in 8 weeks.  Pt will be able to demonstrate full AROM in 8 weeks  Pt will be able to carry 25 pounds without limitation in 8 weeks.     Objective  GAIT: WNL  Squat assess: WNL  Heel toe walk: + left foot drop    Lumbar  % of normal   Flex. 25   Extn. 25   SB Left 75   SB Right 75   ROT Left 50   ROT Right 50   Repetitive testing: held          MMT         AROM    Hip       L       R        L           R   Flex. 4 4 WNL wNL   Extn. 3 3     Abd. 3 3     IR. 4 4 WNL WNL   ER. 4 4 WNL WNL                 MMT    Knee         L        R   Flex. 4 4+   Extn. 4 4+              MMT    Ankle       L        R   PF 3 5   DF. 4 5   EHL 4 5   Inv. 5 5   Ever. 5 5     Posture: fair sitting posture  Dermatome: (pinprick- L/R):  intact  Reflexes:  (L/R) L4:   3+ B/L     S1:   2+ B/L     Clonus= 1 beat each  Slump test: L=  +   R=   -    Straight leg raise:   L=  -    R=    -          Transverse abdominis:  supine march=Fair     Hip/SI joint:   scour=  -     julio cesar = -     Active SLR= + LLE  weakness       Insurance:  AMA/CMS Eval/ Re-eval POC expires Auth #/ Referral # Total    Start date  Expiration date Extension  Visit limitation?  PT only or  PT+OT? Co-Insurance   CMS 12.2.24 1.27.24 needed 7v    30 PCY used 23                    AUTH #:  Date 12.2              Authed: Used 1               Remaining  6                  Precautions: s/p L5-S1 fusion 10/10/24, standard precautions  Patient provided verbal consent to treatment plan and recommended interventions.    Manuals 12.2        visit 1                                   Neuro Re-Ed         B/L sciatic sliders 2*10        TrA BKFO 2*10                                                     Ther Ex         Calf raise 2*10        bridge 2*10                                                              Ther Activity         Pt ed FB

## 2024-12-05 ENCOUNTER — OFFICE VISIT (OUTPATIENT)
Dept: PHYSICAL THERAPY | Facility: CLINIC | Age: 34
End: 2024-12-05
Payer: COMMERCIAL

## 2024-12-05 DIAGNOSIS — Z98.1 S/P LUMBAR FUSION: Primary | ICD-10-CM

## 2024-12-05 PROCEDURE — 97110 THERAPEUTIC EXERCISES: CPT | Performed by: PHYSICAL THERAPIST

## 2024-12-05 PROCEDURE — 97112 NEUROMUSCULAR REEDUCATION: CPT | Performed by: PHYSICAL THERAPIST

## 2024-12-05 NOTE — PROGRESS NOTES
Daily Note     Today's date: 2024  Patient name: Grabiel Fowler  : 1990  MRN: [id number]  Referring provider: Nkechi Bansal MD  Dx:   Encounter Diagnosis     ICD-10-CM    1. S/P lumbar fusion  Z98.1                Subjective: Patient reports good adherence to HEP. Feeling okay entering treatment. Some tightness at this time.     Objective: See treatment diary below    Assessment: Tolerated treatment well. Patient  required occasional cueing for proper exercise performance today.   Difficulty with performing full ROM with bridge exercise noted and only able to complete half motion. Tightness reported with exercise progression today.      Plan: Continue per plan of care.        Insurance:  AMA/CMS Eval/ Re-eval POC expires Auth #/ Referral # Total    Start date  Expiration date Extension  Visit limitation?  PT only or  PT+OT? Co-Insurance   CMS 24 needed 7v    30 PCY used 23                    AUTH #:  Date              Authed: Used 1 2              Remaining  6 5                 Precautions: s/p L5-S1 fusion 10/10/24, standard precautions  Patient provided verbal consent to treatment plan and recommended interventions.    Manuals        visit 1 2                                  Neuro Re-Ed         B/L sciatic sliders 2*10 10x - HEP       TrA BK 2*10 10x- HEP       TrA march  3*10                                           Ther Ex         Calf raise 2*10 HEP       bridge 2*10 2*10       Upright bike  5' lvl 1       Lower trunk rotation  2*10 ea.       Quad rock back  2*10       Slouch overcorrect  2*10                                           Ther Activity         Pt ed FB

## 2024-12-09 ENCOUNTER — OFFICE VISIT (OUTPATIENT)
Dept: PHYSICAL THERAPY | Facility: CLINIC | Age: 34
End: 2024-12-09
Payer: COMMERCIAL

## 2024-12-09 DIAGNOSIS — Z98.1 S/P LUMBAR FUSION: Primary | ICD-10-CM

## 2024-12-09 PROCEDURE — 97110 THERAPEUTIC EXERCISES: CPT | Performed by: PHYSICAL THERAPIST

## 2024-12-09 PROCEDURE — 97112 NEUROMUSCULAR REEDUCATION: CPT | Performed by: PHYSICAL THERAPIST

## 2024-12-09 NOTE — PROGRESS NOTES
"Daily Note     Today's date: 2024  Patient name: Graibel Fowler  : 1990  MRN: [id number]  Referring provider: Nkechi Bansal MD  Dx:   Encounter Diagnosis     ICD-10-CM    1. S/P lumbar fusion  Z98.1                Subjective: Patient reports that he felt okay after last visit, just afraid to perform certain movements and activities.     Objective: See treatment diary below    Assessment: Tolerated treatment well. Patient did well with addition of multifidus activation in quadruped and pball press in standing. Took some cueing to avoid trunk rotation with this exercise.     Plan: Continue per plan of care.        Insurance:  AMA/CMS Eval/ Re-eval POC expires Auth #/ Referral # Total    Start date  Expiration date Extension  Visit limitation?  PT only or  PT+OT? Co-Insurance   CMS 24 needed 7v    30 PCY used 23                    AUTH #:  Date  12            Authed: Used 1 2 3             Remaining  6 5 4                Precautions: s/p L5-S1 fusion 10/10/24, standard precautions  Patient provided verbal consent to treatment plan and recommended interventions.    HEP: B/L sciatic sliders, TrA iso/BKFO, calf raise    Manuals 12. 12. 12.9      visit 1 2 3                                 Neuro Re-Ed         TrA march  3*10 3*10      Clam shell   2*10 ea.      Quad arm lift   2*10 ea.      Stand pball press   2*10, 5\"               Ther Ex         bridge 2*10 2*10 2*10      Upright bike  5' lvl 1 5' lvl 2      Lower trunk rotation  2*10 ea. 2*10 ea.      Quad rock back  2*10 2*10      Slouch overcorrect  2*10 2*10      Stand hip abd.   3*10 ea                                 Ther Activity         Pt ed FB                               "

## 2024-12-11 NOTE — PROGRESS NOTES
"Daily Note     Today's date: 2024  Patient name: Grabiel Fowler  : 1990  MRN: [id number]  Referring provider: Nkechi Bansal MD  Dx:   Encounter Diagnosis     ICD-10-CM    1. S/P lumbar fusion  Z98.1                      Subjective: Pt reports no new complaints, feels stiff. Feels that stiffness is worse in morning.       Objective: requires initial cueing for specific muscle activation throughout, tends to use global/momentum patterns before individual muscle when not cued - corrects and total range improves w/ reps.       Assessment: Tolerated treatment well. Patient demonstrated fatigue post treatment and would benefit from continued PT. Does well w/ exercise progressions. Fatigue but no increase in pain by end of session. Good tolerance to prone work, total time spent ~5 mins as he noted some light discomfort w/ prolonged positioning here. He does well w/ side step, exag gait, and mini squat work on turf. Will look to expand these movements in coming sessions.       Plan: Continue per plan of care. Single leg strength, functional core work (carries, hinge, pallof), gait progressions       Insurance:  AMA/CMS Eval/ Re-eval POC expires Auth #/ Referral # Total    Start date  Expiration date Extension  Visit limitation?  PT only or  PT+OT? Co-Insurance   CMS 24 needed 7v    30 PCY used 23                    AUTH #:  Date            Authed: Used 1 2 3 4            Remaining  6 5 4 3               Precautions: s/p L5-S1 fusion 10/10/24, standard precautions  Patient provided verbal consent to treatment plan and recommended interventions.    HEP: B/L sciatic sliders, TrA iso/BKFO, calf raise    Manuals 12. 12. 12.     visit 1 2 3 4                                Neuro Re-Ed         TrA march  3*10 3*10 Iso x8  March 2x10  Single leg ext x10 B     Clam shell   2*10 ea.      Quad arm lift   2*10 ea.      Stand pball press   2*10, 5\" rev     gait    Exag gait " "- 4 laps total w/ arm swing on turf     squat    24\" sit<>stand x 12 total     Superman progressions    Prone alt arm/leg lift X10-15 each     Ther Ex         bridge 2*10 2*10 2*10 Ad sq x10  [id number]     Upright bike  5' lvl 1 5' lvl 2 5 min lvl 2-3      Lower trunk rotation  2*10 ea. 2*10 ea. X20-30 throughout     Quad rock back  2*10 2*10      Slouch overcorrect  2*10 2*10      Stand hip abd.   3*10 ea                        Hip abd    Green band 20'x8 total     Ther Activity         Pt ed FB                                 "

## 2024-12-12 ENCOUNTER — OFFICE VISIT (OUTPATIENT)
Dept: PHYSICAL THERAPY | Facility: CLINIC | Age: 34
End: 2024-12-12
Payer: COMMERCIAL

## 2024-12-12 DIAGNOSIS — Z98.1 S/P LUMBAR FUSION: Primary | ICD-10-CM

## 2024-12-12 PROCEDURE — 97112 NEUROMUSCULAR REEDUCATION: CPT

## 2024-12-12 PROCEDURE — 97110 THERAPEUTIC EXERCISES: CPT

## 2024-12-16 ENCOUNTER — OFFICE VISIT (OUTPATIENT)
Dept: PHYSICAL THERAPY | Facility: CLINIC | Age: 34
End: 2024-12-16
Payer: COMMERCIAL

## 2024-12-16 DIAGNOSIS — Z98.1 S/P LUMBAR FUSION: Primary | ICD-10-CM

## 2024-12-16 PROCEDURE — 97110 THERAPEUTIC EXERCISES: CPT | Performed by: PHYSICAL THERAPIST

## 2024-12-16 PROCEDURE — 97112 NEUROMUSCULAR REEDUCATION: CPT | Performed by: PHYSICAL THERAPIST

## 2024-12-16 NOTE — PROGRESS NOTES
"Daily Note     Today's date: 2024  Patient name: Grabiel Fowler  : 1990  MRN: [id number]  Referring provider: Nkechi Bansal MD  Dx:   Encounter Diagnosis     ICD-10-CM    1. S/P lumbar fusion  Z98.1       Start Time: 0753  Stop Time: 0835  Total time in clinic (min): 42 minutes  Subjective: Pt reports irritating his back preventing his mother's dog from running out of the front door. Reports ache in left central low back at this time.     Objective: limited lumbar extension and flexion AROM as well as reduced right side bending.     Assessment: Tolerated treatment well. Patient reported feeling better after treatment with less achiness noted. Occasional cueing needed for proper exercise performance today particularly for gluteal activation with bridging.     Plan: Continue per plan of care.        Insurance:  AMA/CMS Eval/ Re-eval POC expires Auth #/ Referral # Total    Start date  Expiration date Extension  Visit limitation?  PT only or  PT+OT? Co-Insurance   CMS 24 needed 7v    30 PCY used 23                    AUTH #:  Date 12.2 12.5 12.9 12 12.16          Authed: 7v Used 1 2 3 4 5           Remaining  6 5 4 3 2            Precautions: s/p L5-S1 fusion 10/10/24, standard precautions  Patient provided verbal consent to treatment plan and recommended interventions.    HEP: B/L sciatic sliders, TrA iso/BKFO, calf raise    Manuals 12.2 12.5 12.9 12.12 12.16    visit 1 2 3 4 5                               Neuro Re-Ed         TrA march  3*10 3*10 Iso x8  March 2x10  Single leg ext x10 B 2*10 march, 2*10 w/ leg extn    Quad arm lift   2*10 ea.      Stand pball press   2*10, 5\" rev     squat    24\" sit<>stand x 12 total     Superman progressions    Prone alt arm/leg lift X10-15 each HELD    Ther Ex         bridge 2*10 2*10 2*10 Ad sq x10  [id number] Ball squeeze, 10*5'', 1*10 bridge    Upright bike  5' lvl 1 5' lvl 2 5 min lvl 2-3  5 min lvl 2-3     Lower trunk rotation  2*10 ea. " 2*10 ea. X20-30 throughout 2*10 ea.    Quad rock back  2*10 2*10      Slouch overcorrect  2*10 2*10  HEP    Stand hip abd.   3*10 ea      Clam shell     2*10 ea.    LAQ for nerve glide     2*10 ea.    Side stepping    Green band 20'x8 total HEP    Ther Activity         Pt ed FB

## 2024-12-23 ENCOUNTER — HOSPITAL ENCOUNTER (OUTPATIENT)
Dept: RADIOLOGY | Facility: HOSPITAL | Age: 34
Discharge: HOME | End: 2024-12-23
Payer: COMMERCIAL

## 2024-12-23 ENCOUNTER — OFFICE VISIT (OUTPATIENT)
Dept: PHYSICAL THERAPY | Facility: CLINIC | Age: 34
End: 2024-12-23
Payer: COMMERCIAL

## 2024-12-23 DIAGNOSIS — M54.50 LOW BACK PAIN, UNSPECIFIED BACK PAIN LATERALITY, UNSPECIFIED CHRONICITY, UNSPECIFIED WHETHER SCIATICA PRESENT: ICD-10-CM

## 2024-12-23 DIAGNOSIS — Z98.1 S/P LUMBAR FUSION: Primary | ICD-10-CM

## 2024-12-23 PROCEDURE — 72114 X-RAY EXAM L-S SPINE BENDING: CPT

## 2024-12-23 PROCEDURE — 97110 THERAPEUTIC EXERCISES: CPT | Performed by: PHYSICAL THERAPIST

## 2024-12-23 PROCEDURE — 97112 NEUROMUSCULAR REEDUCATION: CPT | Performed by: PHYSICAL THERAPIST

## 2024-12-23 NOTE — PROGRESS NOTES
"        Daily Note         Today's date: 2024  Patient name: Grabiel Fowler  : 1990  MRN: [id number]  Referring provider: Nkechi Bansal MD  Dx:   Encounter Diagnosis     ICD-10-CM    1. S/P lumbar fusion  Z98.1           Subjective: Grabiel Fowler reports 2/10 pain entering today \"maybe the cold weather\"        Objective: See treatment diary below    Assessment:   PLOC discussed with primary PT.  patient needed minimal cuing for proper form with TA. Patient performs there ex in a slow deliberate manner.  Needed manual cuing for proper form with bridging to facilitate glutes prior to lifting.      .       Plan:  progress stab as tolerated.           Insurance:  AMA/CMS Eval/ Re-eval POC expires Auth #/ Referral # Total    Start date  Expiration date Extension  Visit limitation?  PT only or  PT+OT? Co-Insurance   CMS 24 needed 7v    30 PCY used 23                    AUTH #:  Date 12. 12.5 12.9  12.16          Authed: 7v Used 1 2 3 4 5 6          Remaining  6 5 4 3 2 1           Precautions: s/p L5-S1 fusion 10/10/24, standard precautions  Patient provided verbal consent to treatment plan and recommended interventions.    HEP: B/L sciatic sliders, TrA iso/BKFO, calf raise    Manuals 12.2 12.5 12.9 12.12 12.16 24   visit 1 2 3 4 5 6/7                                       Neuro Re-Ed         TrA march  3*10 3*10 Iso x8  March 2x10  Single leg ext x10 B 2*10 march, 2*10 w/ leg extn TA: 5 sec x 10  + march: 10 x 2  + leg extension: 10 x 2       Quad arm lift   2*10 ea.      Stand pball press   2*10, 5\" rev     squat    24\" sit<>stand x 12 total  22 in: 10 x 2   Superman progressions    Prone alt arm/leg lift X10-15 each HELD    Ther Ex         bridge 2*10 2*10 2*10 Ad sq x10  [id number] Ball squeeze, 10*5'', 1*10 bridge Ball squeeze, 10*5'',   1*10 bridge: no hip add iso,  10 x w/ iso    Upright bike  5' lvl 1 5' lvl 2 5 min lvl 2-3  5 min lvl 2-3  --          "   Lower trunk rotation  2*10 ea. 2*10 ea. X20-30 throughout 2*10 ea. 10 x 2   Quad rock back  2*10 2*10      Slouch overcorrect  2*10 2*10  HEP    Stand hip abd.   3*10 ea      Clam shell     2*10 ea. 10 x 2 each   LAQ for nerve glide     2*10 ea. 10 x 2    Side stepping    Green band 20'x8 total HEP    Ther Activity         Pt ed FB

## 2024-12-30 ENCOUNTER — OFFICE VISIT (OUTPATIENT)
Dept: PHYSICAL THERAPY | Facility: CLINIC | Age: 34
End: 2024-12-30

## 2024-12-30 DIAGNOSIS — Z98.1 S/P LUMBAR FUSION: Primary | ICD-10-CM

## 2024-12-30 PROCEDURE — 97110 THERAPEUTIC EXERCISES: CPT

## 2024-12-30 PROCEDURE — 97112 NEUROMUSCULAR REEDUCATION: CPT

## 2024-12-30 NOTE — PROGRESS NOTES
"Daily Note     Today's date: 2024  Patient name: Grabiel Fowler  : 1990  MRN: [id number]  Referring provider: Nkechi Bansal MD  Dx:   Encounter Diagnosis     ICD-10-CM    1. S/P lumbar fusion  Z98.1           Start Time: 0800  Stop Time: 0841  Total time in clinic (min): 41 minutes    Subjective: Patient provides no new complaints. He has a follow up with his PCP on       Objective: See treatment diary below      Assessment: Tolerated treatment well. He required a review of activating his transverse abdominus. Added UE movements with core stabilization which he tolerated well with no adverse effects. Patient demonstrated fatigue post treatment, exhibited good technique with therapeutic exercises, and would benefit from continued PT      Plan: Continue per plan of care.  Progress treatment as tolerated.         Insurance:  AMA/CMS Eval/ Re-eval POC expires Auth #/ Referral # Total    Start date  Expiration date Extension  Visit limitation?  PT only or  PT+OT? Co-Insurance   CMS 24 needed 7v    30 PCY used 23                    AUTH #:  Date 12 12.5 12.9 12 12.16  12        Authed: 7v Used 1 2 3 4 5 6 7         Remaining  6 5 4 3 2 1 0          Precautions: s/p L5-S1 fusion 10/10/24, standard precautions  Patient provided verbal consent to treatment plan and recommended interventions.    HEP: B/L sciatic sliders, TrA iso/BKFO, calf raise    Manuals 12.5 12.9 12.12 12.16 24 12.30   visit 2 3 4 5 6/7 7/7                                       Neuro Re-Ed         TrA march 3*10 3*10 Iso x8  March 2x10  Single leg ext x10 B 2*10 march, 2*10 w/ leg extn TA: 5 sec x 10  + march: 10 x 2  + leg extension: 10 x 2     TA 5 sec x10   + march 2*10  + leg extension 15 ea   + OH lift with KB 2*10   Dead bugs 2*10   Quad arm lift  2*10 ea.       Stand pball press  2*10, 5\" rev      squat   24\" sit<>stand x 12 total  22 in: 10 x 2 STS hi low table 2*10   Superman " progressions   Prone alt arm/leg lift X10-15 each HELD     Ther Ex         bridge 2*10 2*10 Ad sq x10  [id number] Ball squeeze, 10*5'', 1*10 bridge Ball squeeze, 10*5'',   1*10 bridge: no hip add iso,  10 x w/ iso  Bridge + hip iso 2*10   Upright bike 5' lvl 1 5' lvl 2 5 min lvl 2-3  5 min lvl 2-3  --             Lower trunk rotation 2*10 ea. 2*10 ea. X20-30 throughout 2*10 ea. 10 x 2 2*10 ea   Quad rock back 2*10 2*10       Slouch overcorrect 2*10 2*10  HEP     Stand hip abd.  3*10 ea       Clam shell    2*10 ea. 10 x 2 each 2*10   LAQ for nerve glide    2*10 ea. 10 x 2  2*10   Side stepping   Green band 20'x8 total HEP     Ther Activity         Pt ed

## 2025-01-02 ENCOUNTER — OFFICE VISIT (OUTPATIENT)
Dept: PHYSICAL THERAPY | Facility: CLINIC | Age: 35
End: 2025-01-02
Payer: COMMERCIAL

## 2025-01-02 DIAGNOSIS — Z98.1 S/P LUMBAR FUSION: Primary | ICD-10-CM

## 2025-01-02 PROCEDURE — 97112 NEUROMUSCULAR REEDUCATION: CPT | Performed by: PHYSICAL THERAPIST

## 2025-01-02 PROCEDURE — 97110 THERAPEUTIC EXERCISES: CPT | Performed by: PHYSICAL THERAPIST

## 2025-01-02 NOTE — PROGRESS NOTES
"Daily Note     Today's date: 2025  Patient name: Grabiel Fowler  : 1990  MRN: [id number]  Referring provider: Nathan Marmolejo DO  Dx:   Encounter Diagnosis     ICD-10-CM    1. S/P lumbar fusion  Z98.1                      Subjective: Patient reports doing okay at this time, a little stiff this morning.       Objective: See treatment diary below      Assessment: Tolerated treatment well. Patient did well with exercise progression today. Slight fatigue reported with quadruped exercise today. Tightness reported after addition of pallof press.     Plan: Continue per plan of care.  Progress treatment as tolerated.         Insurance:  AMA/CMS Eval/ Re-eval POC expires Auth #/ Referral # Total    Start date  Expiration date Extension  Visit limitation?  PT only or  PT+OT? Co-Insurance   CMS 24 needed 7v    30 PCY used 23                    AUTH #:  Date 12 12.5 12.9  12.25       Authed: 7v Used 1 2 3 4 5 6 7         Remaining  6 5 4 3 2 1 0          Precautions: s/p L5-S1 fusion 10/10/24, standard precautions  Patient provided verbal consent to treatment plan and recommended interventions.    HEP: B/L sciatic sliders, TrA iso/BKFO, calf raise    Manuals 12.12 12.16 24...   visit 4 5                     Neuro Re-Ed         Iso x8  March 2x10  Single leg ext x10 B 2*10 march, 2*10 w/ leg extn TA: 5 sec x 10  + march: 10 x 2  + leg extension: 10 x 2     TA 5 sec x10   + march 2*10  + leg extension 15 ea   + OH lift with KB 2*10   Dead bugs 2*10  2*10,  2*10,  2*10 deadbug  2*10 OH KB lift   Quad alt/arm leg      2*10 ea.   squat 24\" sit<>stand x 12 total  22 in: 10 x 2 STS hi low table 2*10 2*10 to chair height   Pallof press     2*10 ea [place] tubing   Suitcase carry     5 laps, 50' ea. 9# KB one hand           Ther Ex        bridge Ad sq x10  [id number] Ball squeeze, 10*5'', 1*10 bridge Ball squeeze, 10*5'',   1*10 bridge: " no hip add iso,  10 x w/ iso  Bridge + hip iso 2*10 Bridge w/ball squeeze 2*10   Upright bike 5 min lvl 2-3  5 min lvl 2-3  --  6' lvl 2   Lower trunk rotation X20-30 throughout 2*10 ea. 10 x 2 2*10 ea 1*10 ea.   Quad rock back     HEP   Clam shell  2*10 ea. 10 x 2 each 2*10 Not performed   Side stepping Green band 20'x8 total HEP                      Ther Activity        Pt ed

## 2025-01-06 ENCOUNTER — OFFICE VISIT (OUTPATIENT)
Dept: PHYSICAL THERAPY | Facility: CLINIC | Age: 35
End: 2025-01-06
Payer: COMMERCIAL

## 2025-01-06 DIAGNOSIS — Z98.1 S/P LUMBAR FUSION: Primary | ICD-10-CM

## 2025-01-06 PROCEDURE — 97110 THERAPEUTIC EXERCISES: CPT

## 2025-01-06 PROCEDURE — 97112 NEUROMUSCULAR REEDUCATION: CPT

## 2025-01-06 NOTE — PROGRESS NOTES
Daily Note     Today's date: 2025  Patient name: Grabiel Fowler  : 1990  MRN: [id number]  Referring provider: Nathan Marmolejo DO  Dx:   Encounter Diagnosis     ICD-10-CM    1. S/P lumbar fusion  Z98.1           Start Time: 0800  Stop Time: 0845  Total time in clinic (min): 45 minutes    Subjective: Patient reports mild stiffness about low back at the start of today's session.      Objective: See treatment diary below      Assessment: Tolerated treatment well. Patient provided w/ intermittent vc during today's session to promote technique and quality of TA activation. Initiated RDL, patient demo mobility deficits, achieving ~25% of motion. Reviewed DOMS for 24-48 hours post session, pt verbalized good understanding/agreement. Patient will continue to benefit from skilled PT to improve functional mobility and activity tolerance.      Plan: Continue per plan of care.  Progress per primary PT nv.       Insurance:  AMA/CMS Eval/ Re-eval POC expires Auth #/ Referral # Total    Start date  Expiration date Extension  Visit limitation?  PT only or  PT+OT? Co-Insurance   CMS 24 needed 7v    30 PCY used 23                    AUTH #:  Date  12.5 12.9  12.25 1-6      Authed: 7v Used 1 2 3 4 5 6 7         Remaining  6 5 4 3 2 1 0          Precautions: s/p L5-S1 fusion 10/10/24, standard precautions  Patient provided verbal consent to treatment plan and recommended interventions.    HEP: B/L sciatic sliders, TrA iso/BKFO, calf raise    Manuals 12.12 12.16 24. 1..25 25   visit 4 5 6/7 7/7                       Neuro Re-Ed         TrA march Iso x8  March 2x10  Single leg ext x10 B 2*10 march, 2*10 w/ leg extn TA: 5 sec x 10  + march: 10 x 2  + leg extension: 10 x 2     TA 5 sec x10   + march 2*10  + leg extension 15 ea   + OH lift with KB 2*10   Dead bugs 2*10 TrA march 2*10,  2*10,  2*10 deadbug  2*10 OH KB lift Deadbugs 2x10   Quad alt/arm leg     "  2*10 ea.    squat 24\" sit<>stand x 12 total  22 in: 10 x 2 STS hi low table 2*10 2*10 to chair height    Pallof press     2*10 ea Orange tubing 7.5# 3x10 each   Suitcase carry     5 laps, 50' ea. 9# KB one hand 9# KB, 60 feet 3 laps each UE   Chops      7.5# 3x10 each   Split stance row      7.5# 3x10 each   RDL      9# KB 2x10                     Ther Ex         bridge Ad sq x10  [id number] Ball squeeze, 10*5'', 1*10 bridge Ball squeeze, 10*5'',   1*10 bridge: no hip add iso,  10 x w/ iso  Bridge + hip iso 2*10 Bridge w/ball squeeze 2*10 Bridge w/ ad sq 2x10   Upright bike 5 min lvl 2-3  5 min lvl 2-3  --  6' lvl 2 5 min   Lower trunk rotation X20-30 throughout 2*10 ea. 10 x 2 2*10 ea 1*10 ea. 5\" x10 each   Quad rock back     HEP    Clam shell  2*10 ea. 10 x 2 each 2*10 Not performed    Side stepping Green band 20'x8 total HEP                         Ther Activity         Pt ed                                         "

## 2025-01-10 ENCOUNTER — APPOINTMENT (OUTPATIENT)
Dept: PHYSICAL THERAPY | Facility: CLINIC | Age: 35
End: 2025-01-10
Payer: COMMERCIAL

## 2025-01-13 ENCOUNTER — OFFICE VISIT (OUTPATIENT)
Dept: PHYSICAL THERAPY | Facility: CLINIC | Age: 35
End: 2025-01-13
Payer: COMMERCIAL

## 2025-01-13 DIAGNOSIS — Z98.1 S/P LUMBAR FUSION: Primary | ICD-10-CM

## 2025-01-13 PROCEDURE — 97110 THERAPEUTIC EXERCISES: CPT | Performed by: PHYSICAL THERAPIST

## 2025-01-13 PROCEDURE — 97112 NEUROMUSCULAR REEDUCATION: CPT | Performed by: PHYSICAL THERAPIST

## 2025-01-13 NOTE — PROGRESS NOTES
Daily Note     Today's date: 2025  Patient name: Grabiel Fowler  : 1990  MRN: [id number]  Referring provider: Nathan Marmolejo DO  Dx:   Encounter Diagnosis     ICD-10-CM    1. S/P lumbar fusion  Z98.1                      Subjective: Patient reports doing okay entering treatment, some stiffness at this time.     Objective: See treatment diary below    Assessment: Tolerated treatment well. Cueing needed for proper body positioning with RDL and with split uni rows. Fatigue reported post session.     Plan: Continue core strengthening.        Insurance:  AMA/CMS Eval/ Re-eval POC expires Auth #/ Referral # Total    Start date  Expiration date Extension  Visit limitation?  PT only or  PT+OT? Co-Insurance   CMS .24 needed 7v    30 PCY used 23                    AUTH #:  Date 25-     Authed: 7v Used 1 2 3      Remaining  29 28 27       Precautions: s/p L5-S1 fusion 10/10/24, standard precautions  Patient provided verbal consent to treatment plan and recommended interventions.    HEP: B/L sciatic sliders, TrA iso/BKFO, calf raise, quad rock back    Manuals 24 12.30 1.2.25 25   visit 6/7 7/7  2 / 30 3 of 30                   Neuro Re-Ed         TA: 5 sec x 10  + march: 10 x 2  + leg extension: 10 x 2     TA 5 sec x10   + march 2*10  + leg extension 15 ea   + OH lift with KB 2*10   Dead bugs 2*10  2*10,  2*10,  2*10 deadbug  2*10 OH KB lift Deadbugs 2x10 Deadbugs 3*10  2*10 OH KB lift   Quad alt/arm leg    2*10 ea.     squat 22 in: 10 x 2 STS hi low table 2*10 2*10 to chair height     Pallof press   2*10 ea Orange tubing 7.5# 3x10 each 7.5# 3x10 ea   Suitcase carry   5 laps, 50' ea. 9# KB one hand 9# KB, 60 feet 3 laps each UE 15# 50', 3 laps, 1 hand switching   Chops    7.5# 3x10 each 7.5# 3x10 ea   Split stance row    7.5# 3x10 each 7.5# 3x10 ea   RDL    9# KB 2x10 2*10, 9# KB                   Ther Ex        bridge Ball squeeze, 10*5'',  "  1*10 bridge: no hip add iso,  10 x w/ iso  Bridge + hip iso 2*10 Bridge w/ball squeeze 2*10 Bridge w/ ad sq 2x10 2*10 bridge w/cueing   Upright bike --  6' lvl 2 5 min 6' lvl 2   Lower trunk rotation 10 x 2 2*10 ea 1*10 ea. 5\" x10 each 1*10 ea.                            Ther Activity        Pt ed                     "

## 2025-01-17 ENCOUNTER — OFFICE VISIT (OUTPATIENT)
Dept: PHYSICAL THERAPY | Facility: CLINIC | Age: 35
End: 2025-01-17
Payer: COMMERCIAL

## 2025-01-17 DIAGNOSIS — Z98.1 S/P LUMBAR FUSION: Primary | ICD-10-CM

## 2025-01-17 PROCEDURE — 97110 THERAPEUTIC EXERCISES: CPT

## 2025-01-17 PROCEDURE — 97112 NEUROMUSCULAR REEDUCATION: CPT

## 2025-01-17 NOTE — PROGRESS NOTES
Daily Note     Today's date: 2025  Patient name: Grabiel Fowler  : 1990  MRN: [id number]  Referring provider: Nathan Marmolejo DO  Dx:   Encounter Diagnosis     ICD-10-CM    1. S/P lumbar fusion  Z98.1                      Subjective: patient provides no new complaints       Objective: See treatment diary below      Assessment: POC discussed with primary PT prior to session. Tolerated treatment well. Added bird dogs broken down to UE/LE components. He tolerated well with appropriate level of challenge, specifically with alternating LE kicks. Patient demonstrated fatigue post treatment, exhibited good technique with therapeutic exercises, and would benefit from continued PT      Plan: Continue POC established by primary PT        Insurance:  AMA/CMS Eval/ Re-eval POC expires Auth #/ Referral # Total    Start date  Expiration date Extension  Visit limitation?  PT only or  PT+OT? Co-Insurance   CMS 12.24 needed 7v    30 PCY used 23                    AUTH #:  Date 25-     Authed: 7v Used 1 2 3      Remaining   28 27       Precautions: s/p L5-S1 fusion 10/10/24, standard precautions  Patient provided verbal consent to treatment plan and recommended interventions.    HEP: B/L sciatic sliders, TrA iso/BKFO, calf raise, quad rock back    Manuals 12.30 1.2.25 25   visit 7/7  2 / 30 3 of 30 4/30                    Neuro Re-Ed         TA 5 sec x10   + march 2*10  + leg extension 15 ea   + OH lift with KB 2*10   Dead bugs 2*10 TrA march 2*10,  2*10,  2*10 deadbug  2*10 OH KB lift Deadbugs 2x10 Deadbugs 3*10  2*10 OH KB lift Deadbugs 3*10  2*10 OH KB lift   Quad alt/arm leg   2*10 ea.      squat STS hi low table 2*10 2*10 to chair height      Pallof press  2*10 ea Orange tubing 7.5# 3x10 each 7.5# 3x10 ea 9# 3x10 ea   Suitcase carry  5 laps, 50' ea. 9# KB one hand 9# KB, 60 feet 3 laps each UE 15# 50', 3 laps, 1 hand switching 15# 50', 3 laps, 1 hand  "switching   Chops   7.5# 3x10 each 7.5# 3x10 ea 9# 3x10 ea   Split stance row   7.5# 3x10 each 7.5# 3x10 ea 9# 3x10 ea   RDL   9# KB 2x10 2*10, 9# KB 2*10 9# KB    Bird dog      Alt UE only 10 ea    Alt LE only 10 ea            Ther Ex        bridge Bridge + hip iso 2*10 Bridge w/ball squeeze 2*10 Bridge w/ ad sq 2x10 2*10 bridge w/cueing 2*10 bridge w/cueing    Upright bike  6' lvl 2 5 min 6' lvl 2 6' lvl 2    Lower trunk rotation 2*10 ea 1*10 ea. 5\" x10 each 1*10 ea.  1*10 ea                            Ther Activity        Pt ed                       "

## 2025-01-20 ENCOUNTER — APPOINTMENT (OUTPATIENT)
Dept: PHYSICAL THERAPY | Facility: CLINIC | Age: 35
End: 2025-01-20
Payer: COMMERCIAL

## 2025-01-21 ENCOUNTER — OFFICE VISIT (OUTPATIENT)
Dept: PHYSICAL THERAPY | Facility: CLINIC | Age: 35
End: 2025-01-21
Payer: COMMERCIAL

## 2025-01-21 DIAGNOSIS — Z98.1 S/P LUMBAR FUSION: Primary | ICD-10-CM

## 2025-01-21 PROCEDURE — 97112 NEUROMUSCULAR REEDUCATION: CPT | Performed by: PHYSICAL THERAPIST

## 2025-01-21 NOTE — PROGRESS NOTES
Daily Note     Today's date: 2025  Patient name: Grabiel Fowler  : 1990  MRN: [id number]  Referring provider: Nathan Marmolejo DO  Dx:   Encounter Diagnosis     ICD-10-CM    1. S/P lumbar fusion  Z98.1                Subjective: patient reports that he has been feeling better overall. No real discomfort and tightness improves with movement.     Objective: See treatment diary below    Assessment: Patient did well with core based exercise progression. Occasional cueing needed to avoid lumbar compensation with strengthening based movements.     Plan: Continue functional exercise progression.        Insurance:  AMA/CMS Eval/ Re-eval POC expires Auth #/ Referral # Total    Start date  Expiration date Extension  Visit limitation?  PT only or  PT+OT? Co-Insurance   CMS 12.2.24 24 needed 7v    30 PCY used 23                    AUTH #:  Date 25 1-     Authed: 7v Used 1 2 3 4 5      Remaining  29 28 27 26 25       Precautions: s/p L5-S1 fusion 10/10/24, standard precautions  Patient provided verbal consent to treatment plan and recommended interventions.    HEP: B/L sciatic sliders, TrA iso/BKFO, calf raise, quad rock back    Manuals 1.2.25 25   visit  2 / 30 3 of 30 4/30  5 / 30                   Neuro Re-Ed         2*10,  2*10,  2*10 deadbug  2*10 OH KB lift Deadbugs 2x10 Deadbugs 3*10  2*10 OH KB lift Deadbugs 3*10  2*10 OH KB lift Deadbugs 3*10  2*10 OH 9# KB lift   Quad alt/arm leg  2*10 ea.   Alt UE only 10 ea  Alt LE only 10 ea  Alt UE only 10*2 ea  Alt LE only 10*2 ea    Pallof press 2*10 ea Orange tubing 7.5# 3x10 each 7.5# 3x10 ea 9# 3x10 ea 9#, 3*10 ea.   Suitcase carry 5 laps, 50' ea. 9# KB one hand 9# KB, 60 feet 3 laps each UE 15# 50', 3 laps, 1 hand switching 15# 50', 3 laps, 1 hand switching 20# 50', 5 laps, 1 hand switching   Chops  7.5# 3x10 each 7.5# 3x10 ea 9# 3x10 ea 9# 3*10 ea.   Split stance row  7.5# 3x10  "each 7.5# 3x10 ea 9# 3x10 ea 12# 2*12 ea.   RDL  9# KB 2x10 2*10, 9# KB 2*10 9# KB  2*10, 20# weight   lifts     9#, 3*10 ea.           Ther Ex        bridge Bridge w/ball squeeze 2*10 Bridge w/ ad sq 2x10 2*10 bridge w/cueing 2*10 bridge w/cueing     Upright bike 6' lvl 2 5 min 6' lvl 2 6' lvl 2  Not performed   Lower trunk rotation 1*10 ea. 5\" x10 each 1*10 ea.  1*10 ea                             Ther Activity        Pt ed                       "

## 2025-01-24 ENCOUNTER — OFFICE VISIT (OUTPATIENT)
Dept: PHYSICAL THERAPY | Facility: CLINIC | Age: 35
End: 2025-01-24
Payer: COMMERCIAL

## 2025-01-24 DIAGNOSIS — Z98.1 S/P LUMBAR FUSION: Primary | ICD-10-CM

## 2025-01-24 PROCEDURE — 97112 NEUROMUSCULAR REEDUCATION: CPT | Performed by: PHYSICAL THERAPIST

## 2025-01-24 NOTE — PROGRESS NOTES
Daily Note     Today's date: 2025  Patient name: Grabiel Fowler  : 1990  MRN: [id number]  Referring provider: Nathan Marmolejo DO  Dx:   Encounter Diagnosis     ICD-10-CM    1. S/P lumbar fusion  Z98.1                Subjective: patient reports that he felt okay after progression in exercises last visit.     Objective: See treatment diary below    Assessment: Patient did very well with exercises today. Minimal cueing needed for proper core engagement as well as with lumbar positioning with quad based exercises. Fatigue reported post treatment.     Plan: Add weighted squats next visit.        Insurance:  AMA/CMS Eval/ Re-eval POC expires Auth #/ Referral # Total    Start date  Expiration date Extension  Visit limitation?  PT only or  PT+OT? Co-Insurance   CMS 12.2.24 24 needed 30 PCY    30 PCY used 23                    Precautions: s/p L5-S1 fusion 10/10/24, standard precautions  Patient provided verbal consent to treatment plan and recommended interventions.    HEP: B/L sciatic sliders, TrA iso/BKFO, calf raise, quad rock back    Manuals 25   visit 2 / 30 3 of 30 4/30  5 / 30 6 of 30                   Neuro Re-Ed        TrA march Deadbugs 2x10 Deadbugs 3*10  2*10 OH KB lift Deadbugs 3*10  2*10 OH KB lift Deadbugs 3*10  2*10 OH 9# KB lift Deadbug 2x15  2x10 OH lift 9# KB w/feet elevated   Quad alt/arm leg    Alt UE only 10 ea  Alt LE only 10 ea  Alt UE only 10*2 ea  Alt LE only 10*2 ea  Alt UE only 10*2 ea  Alt LE only 10*2 ea    Pallof press 7.5# 3x10 each 7.5# 3x10 ea 9# 3x10 ea 9#, 3*10 ea. 9#, 3*10 ea.   Suitcase carry 9# KB, 60 feet 3 laps each UE 15# 50', 3 laps, 1 hand switching 15# 50', 3 laps, 1 hand switching 20# 50', 5 laps, 1 hand switching 20# 50', 5 laps, 1 hand switching   Chops 7.5# 3x10 each 7.5# 3x10 ea 9# 3x10 ea 9# 3*10 ea. 9#, 2x15 ea.   Split stance row 7.5# 3x10 each 7.5# 3x10 ea 9# 3x10 ea 12# 2*12 ea. 14.5# 3x10 ea.   RDL 9# KB  "2x10 2*10, 9# KB 2*10 9# KB  2*10, 20# weight 2x12, 20# DB   Lifts CC    9#, 3*10 ea. 9#, 3*10 ea.           Ther Ex        bridge Bridge w/ ad sq 2x10 2*10 bridge w/cueing 2*10 bridge w/cueing      Upright bike 5 min 6' lvl 2 6' lvl 2  Not performed    Lower trunk rotation 5\" x10 each 1*10 ea.  1*10 ea                              Ther Activity        Pt ed                       "

## 2025-01-27 ENCOUNTER — OFFICE VISIT (OUTPATIENT)
Dept: PHYSICAL THERAPY | Facility: CLINIC | Age: 35
End: 2025-01-27
Payer: COMMERCIAL

## 2025-01-27 DIAGNOSIS — Z98.1 S/P LUMBAR FUSION: Primary | ICD-10-CM

## 2025-01-27 PROCEDURE — 97110 THERAPEUTIC EXERCISES: CPT

## 2025-01-27 PROCEDURE — 97112 NEUROMUSCULAR REEDUCATION: CPT

## 2025-01-27 NOTE — PROGRESS NOTES
Daily Note     Today's date: 2025  Patient name: Grabiel Fowler  : 1990  MRN: [id number]  Referring provider: Nathan Marmolejo DO  Dx:   Encounter Diagnosis     ICD-10-CM    1. S/P lumbar fusion  Z98.1           Start Time: 0800  Stop Time: 08  Total time in clinic (min): 42 minutes    Subjective: Patient reports when he wakes up he usually has been having stiffness in his back but today his back is less stiff.       Objective: See treatment diary below      Assessment: Tolerated treatment well. Patient  was able to progress the resistance he completed split stance rows, RDL's and suitcase carries with while maintaining proper core activation and form throughout the exercise. Patient reported that he felt fatigue following RDL's with heavier weight today. Patient required minor cueing during bird dog exercise to maintain level back and pelvis. Continue to progress patient with core stabilization and functional strengthening movements in standing.        Plan: Continue per plan of care.        Insurance:  AMA/CMS Eval/ Re-eval POC expires Auth #/ Referral # Total    Start date  Expiration date Extension  Visit limitation?  PT only or  PT+OT? Co-Insurance   CMS 12.2.24 24 needed 30 PCY    30 PCY used 23                    Precautions: s/p L5-S1 fusion 10/10/24, standard precautions  Patient provided verbal consent to treatment plan and recommended interventions.    HEP: B/L sciatic sliders, TrA iso/BKFO, calf raise, quad rock back    Manuals 25   visit 2 / 30 3 of 30 4/30  5 / 30 6 of 30 7/30                     Neuro Re-Ed         TrA march Deadbugs 2x10 Deadbugs 3*10  2*10 OH KB lift Deadbugs 3*10  2*10 OH KB lift Deadbugs 3*10  2*10 OH 9# KB lift Deadbug 2x15  2x10 OH lift 9# KB w/feet elevated Dead bug 2x15    2x10 OH lift 9# kb with feet elevated    Quad alt/arm leg    Alt UE only 10 ea  Alt LE only 10 ea  Alt UE only 10*2 ea  Alt LE only  "10*2 ea  Alt UE only 10*2 ea  Alt LE only 10*2 ea  Alt ue only 2x10 and alt le 2x10    Pallof press 7.5# 3x10 each 7.5# 3x10 ea 9# 3x10 ea 9#, 3*10 ea. 9#, 3*10 ea. 9# 3x10   Suitcase carry 9# KB, 60 feet 3 laps each UE 15# 50', 3 laps, 1 hand switching 15# 50', 3 laps, 1 hand switching 20# 50', 5 laps, 1 hand switching 20# 50', 5 laps, 1 hand switching 25# 50' 5 laps alt hands    Chops 7.5# 3x10 each 7.5# 3x10 ea 9# 3x10 ea 9# 3*10 ea. 9#, 2x15 ea. 9# 2x15   Split stance row 7.5# 3x10 each 7.5# 3x10 ea 9# 3x10 ea 12# 2*12 ea. 14.5# 3x10 ea. 15.5# 3x10 ea   RDL 9# KB 2x10 2*10, 9# KB 2*10 9# KB  2*10, 20# weight 2x12, 20# DB 3x10 25# DB   Lifts CC    9#, 3*10 ea. 9#, 3*10 ea. 9# 3x10 ea            Ther Ex         bridge Bridge w/ ad sq 2x10 2*10 bridge w/cueing 2*10 bridge w/cueing       Upright bike 5 min 6' lvl 2 6' lvl 2  Not performed  6 min lvl 3   Lower trunk rotation 5\" x10 each 1*10 ea.  1*10 ea                                  Ther Activity         Pt ed                           "

## 2025-01-31 ENCOUNTER — OFFICE VISIT (OUTPATIENT)
Dept: PHYSICAL THERAPY | Facility: CLINIC | Age: 35
End: 2025-01-31
Payer: COMMERCIAL

## 2025-01-31 DIAGNOSIS — Z98.1 S/P LUMBAR FUSION: Primary | ICD-10-CM

## 2025-01-31 PROCEDURE — 97110 THERAPEUTIC EXERCISES: CPT | Performed by: PHYSICAL THERAPIST

## 2025-01-31 PROCEDURE — 97112 NEUROMUSCULAR REEDUCATION: CPT | Performed by: PHYSICAL THERAPIST

## 2025-01-31 NOTE — PROGRESS NOTES
Daily Note     Today's date: 2025  Patient name: Grabiel Fowler  : 1990  MRN: [id number]  Referring provider: Nathan Marmolejo DO  Dx:   Encounter Diagnosis     ICD-10-CM    1. S/P lumbar fusion  Z98.1           Start Time: 0750  Stop Time: 0830  Total time in clinic (min): 40 minutes    Subjective: Patient reports that he slipped in the shower the other day landing on his back and butt. Reports was sore after this but has been feeling back to his normal self after. Reports good adherence with HEP and feeling good overall.     Objective: See treatment diary below    Pain  Current pain ratin  At worst pain ratin           Short Term: met  Pt will report decreased levels of pain by at least 2 subjective ratings in 4 weeks  Pt will demonstrate improved ROM by at least 10 degrees in 4 weeks  Pt will demonstrate improved strength by 1/2 grade in 4 weeks.  Pt will be able to carry 10 pounds without issue in 4 weeks  Long Term: met  Pt will be independent in their HEP in 8 weeks  Pt will be pain free with IADL's in 8 weeks.  Pt will be able to demonstrate full AROM in 8 weeks  Pt will be able to carry 25 pounds without limitation in 8 weeks.      Objective  GAIT: WNL  Squat assess: WNL     Lumbar  % of normal   Flex. 75   Extn. 75   SB Left 75   SB Right 75   ROT Left 75   ROT Right 75      Repetitive testing: held            MMT          AROM     Hip       L       R        L           R   Flex. 4 4 WNL wNL   Extn. 4 4       Abd. 3 3       IR. 4 4 WNL WNL   ER. 4 4 WNL WNL                  MMT     Knee         L        R   Flex. 4+ 4+   Extn. 4+ 4+                MMT     Ankle       L        R   PF 5 5   DF. 4 5   EHL 4 5   Inv. 5 5   Ever. 5 5      Posture: fair sitting posture  Dermatome: (pinprick- L/R):  intact  Reflexes:  (L/R) L4:   3+ B/L     S1:   2+ B/L     Clonus= 1 beat each  Slump test: L=  +   R=   -    Straight leg raise:   L=  -    R=    -           Transverse abdominis:   supine march=Fair      Hip/SI joint:   scour=  -     julio cesar = -     Active SLR= -    Assessment: Tolerated treatment well. Patient demonstrates improved motion and strength since starting therapy. He reports increased confidence with ADLs and is compliant with HEP. Patient has met goals set forth in therapy. Patient set to return to referring provider in next few weeks with anticipated return back to work in March.     Plan: 1 visit to allow for treatment rendered today.        Insurance:  AMA/CMS Eval/ Re-eval POC expires Auth #/ Referral # Total    Start date  Expiration date Extension  Visit limitation?  PT only or  PT+OT? Co-Insurance   CMS 12.2.24 1.27.24 needed 30 PCY    30 PCY used 23                    Precautions: s/p L5-S1 fusion 10/10/24, standard precautions  Patient provided verbal consent to treatment plan and recommended interventions.    HEP: B/L sciatic sliders, TrA iso/BKFO, calf raise, quad rock back    Manuals 1/17/25 1/21/25 1/24/25 1/27/25 1/31/25   visit 4/30 5 / 30 6 of 30 7/30 8/30                   Neuro Re-Ed        TrA march Deadbugs 3*10  2*10 OH KB lift Deadbugs 3*10  2*10 OH 9# KB lift Deadbug 2x15  2x10 OH lift 9# KB w/feet elevated Dead bug 2x15    2x10 OH lift 9# kb with feet elevated  Low rows, 3x10, 12.5#   Quad alt/arm leg  Alt UE only 10 ea  Alt LE only 10 ea  Alt UE only 10*2 ea  Alt LE only 10*2 ea  Alt UE only 10*2 ea  Alt LE only 10*2 ea  Alt ue only 2x10 and alt le 2x10     Pallof press 9# 3x10 ea 9#, 3*10 ea. 9#, 3*10 ea. 9# 3x10 10.5# 3x10   Suitcase carry 15# 50', 3 laps, 1 hand switching 20# 50', 5 laps, 1 hand switching 20# 50', 5 laps, 1 hand switching 20# 50' 5 laps alt hands  20# 50' 5 laps alt hands    Chops 9# 3x10 ea 9# 3*10 ea. 9#, 2x15 ea. 9# 2x15 3x10, 12.5#   Split stance row 9# 3x10 ea 12# 2*12 ea. 14.5# 3x10 ea. 15.5# 3x10 ea 15.5# 3x10 ea   RDL 2*10 9# KB  2*10, 20# weight 2x12, 20# DB 3x10 20# DB 3x10, 20# DB   Lifts CC  9#, 3*10 ea. 9#, 3*10 ea. 9# 3x10  ea 3x10, 12.5#           Ther Ex        bridge 2*10 bridge w/cueing        Upright bike 6' lvl 2  Not performed  6 min lvl 3 5' lvl 3   Lower trunk rotation 1*10 ea                                Ther Activity        Pt ed

## 2025-01-31 NOTE — LETTER
2025    Nathan Marmolejo DO  [address] [address]    Patient: Grabiel Fowler   YOB: 1990   Date of Visit: 2025     Encounter Diagnosis     ICD-10-CM    1. S/P lumbar fusion  Z98.1           Dear Dr. Marmolejo:    Thank you for your recent referral of Grabiel Fowler. Please review the attached evaluation summary from Grabiel's recent visit.     Please verify that you agree with the plan of care by signing the attached order.     If you have any questions or concerns, please do not hesitate to call.     I sincerely appreciate the opportunity to share in the care of one of your patients and hope to have another opportunity to work with you in the near future.       Sincerely,    Maulik Mirza, PT      Referring Provider:      I certify that I have read the below Plan of Care and certify the need for these services furnished under this plan of treatment while under my care.                    Nathan Marmolejo DO  [address] [address]  Via Fax: [phone]          Daily Note     Today's date: 2025  Patient name: Grabiel Fowler  : 1990  MRN: [id number]  Referring provider: Nathan Marmolejo DO  Dx:   Encounter Diagnosis     ICD-10-CM    1. S/P lumbar fusion  Z98.1           Start Time: 0750  Stop Time: 0830  Total time in clinic (min): 40 minutes    Subjective: Patient reports that he slipped in the shower the other day landing on his back and butt. Reports was sore after this but has been feeling back to his normal self after. Reports good adherence with HEP and feeling good overall.     Objective: See treatment diary below    Pain  Current pain ratin  At worst pain ratin           Short Term: met  Pt will report decreased levels of pain by at least 2 subjective ratings in 4 weeks  Pt will demonstrate improved ROM by at least 10 degrees in 4 weeks  Pt will demonstrate improved strength by 1/2 grade in 4 weeks.  Pt  will be able to carry 10 pounds without issue in 4 weeks  Long Term: met  Pt will be independent in their HEP in 8 weeks  Pt will be pain free with IADL's in 8 weeks.  Pt will be able to demonstrate full AROM in 8 weeks  Pt will be able to carry 25 pounds without limitation in 8 weeks.      Objective  GAIT: WNL  Squat assess: WNL     Lumbar  % of normal   Flex. 75   Extn. 75   SB Left 75   SB Right 75   ROT Left 75   ROT Right 75      Repetitive testing: held            MMT          AROM     Hip       L       R        L           R   Flex. 4 4 WNL wNL   Extn. 4 4       Abd. 3 3       IR. 4 4 WNL WNL   ER. 4 4 WNL WNL                  MMT     Knee         L        R   Flex. 4+ 4+   Extn. 4+ 4+                MMT     Ankle       L        R   PF 5 5   DF. 4 5   EHL 4 5   Inv. 5 5   Ever. 5 5      Posture: fair sitting posture  Dermatome: (pinprick- L/R):  intact  Reflexes:  (L/R) L4:   3+ B/L     S1:   2+ B/L     Clonus= 1 beat each  Slump test: L=  +   R=   -    Straight leg raise:   L=  -    R=    -           Transverse abdominis:  supine march=Fair      Hip/SI joint:   scour=  -     julio cesar = -     Active SLR= -    Assessment: Tolerated treatment well. Patient demonstrates improved motion and strength since starting therapy. He reports increased confidence with ADLs and is compliant with HEP. Patient has met goals set forth in therapy. Patient set to return to referring provider in next few weeks with anticipated return back to work in March.     Plan: 1 visit to allow for treatment rendered today.        Insurance:  AMA/CMS Eval/ Re-eval POC expires Auth #/ Referral # Total    Start date  Expiration date Extension  Visit limitation?  PT only or  PT+OT? Co-Insurance   CMS 12.2.24 1.27.24 needed 30 PCY    30 PCY used 23                    Precautions: s/p L5-S1 fusion 10/10/24, standard precautions  Patient provided verbal consent to treatment plan and recommended interventions.    HEP: B/L sciatic sliders, TrA  iso/BKFO, calf raise, quad rock back    Manuals 1/17/25 1/21/25 1/24/25 1/27/25 1/31/25   visit 4/30 5 / 30 6 of 30 7/30 8/30                   Neuro Re-Ed        TrA march Deadbugs 3*10  2*10 OH KB lift Deadbugs 3*10  2*10 OH 9# KB lift Deadbug 2x15  2x10 OH lift 9# KB w/feet elevated Dead bug 2x15    2x10 OH lift 9# kb with feet elevated  Low rows, 3x10, 12.5#   Quad alt/arm leg  Alt UE only 10 ea  Alt LE only 10 ea  Alt UE only 10*2 ea  Alt LE only 10*2 ea  Alt UE only 10*2 ea  Alt LE only 10*2 ea  Alt ue only 2x10 and alt le 2x10     Pallof press 9# 3x10 ea 9#, 3*10 ea. 9#, 3*10 ea. 9# 3x10 10.5# 3x10   Suitcase carry 15# 50', 3 laps, 1 hand switching 20# 50', 5 laps, 1 hand switching 20# 50', 5 laps, 1 hand switching 20# 50' 5 laps alt hands  20# 50' 5 laps alt hands    Chops 9# 3x10 ea 9# 3*10 ea. 9#, 2x15 ea. 9# 2x15 3x10, 12.5#   Split stance row 9# 3x10 ea 12# 2*12 ea. 14.5# 3x10 ea. 15.5# 3x10 ea 15.5# 3x10 ea   RDL 2*10 9# KB  2*10, 20# weight 2x12, 20# DB 3x10 20# DB 3x10, 20# DB   Lifts CC  9#, 3*10 ea. 9#, 3*10 ea. 9# 3x10 ea 3x10, 12.5#           Ther Ex        bridge 2*10 bridge w/cueing        Upright bike 6' lvl 2  Not performed  6 min lvl 3 5' lvl 3   Lower trunk rotation 1*10 ea                                Ther Activity        Pt ed

## 2025-06-11 ENCOUNTER — APPOINTMENT (OUTPATIENT)
Dept: LAB | Facility: CLINIC | Age: 35
End: 2025-06-11
Payer: COMMERCIAL

## 2025-06-11 DIAGNOSIS — Z13.79 GENETIC SCREENING: ICD-10-CM

## 2025-06-11 DIAGNOSIS — Z13.79 GENETIC SCREENING: Primary | ICD-10-CM

## 2025-06-11 PROCEDURE — 36415 COLL VENOUS BLD VENIPUNCTURE: CPT

## 2025-06-17 ENCOUNTER — RESULTS FOLLOW-UP (OUTPATIENT)
Dept: OBGYN CLINIC | Facility: CLINIC | Age: 35
End: 2025-06-17

## 2025-06-17 LAB
CITATION REF LAB TEST: NORMAL
CLINICAL INFO: NORMAL
ETHNIC BACKGROUND STATED: NORMAL
GENE DIS ANL CARRIER INTERP-IMP: NORMAL
GENE MUT TESTED BLD/T: NORMAL
LAB DIRECTOR NAME PROVIDER: NORMAL
REASON FOR REFERRAL (NARRATIVE): NORMAL
RECOMMENDATION PATIENT DOC-IMP: NORMAL
REF LAB TEST METHOD: NORMAL
SERVICE CMNT-IMP: NORMAL
SMN1 GENE MUT ANL BLD/T: NORMAL
SPECIMEN SOURCE: NORMAL

## 2025-06-23 ENCOUNTER — HOSPITAL ENCOUNTER (OUTPATIENT)
Dept: RADIOLOGY | Facility: HOSPITAL | Age: 35
Discharge: HOME | End: 2025-06-23
Payer: COMMERCIAL

## 2025-06-23 DIAGNOSIS — M54.50 LOW BACK PAIN, UNSPECIFIED BACK PAIN LATERALITY, UNSPECIFIED CHRONICITY, UNSPECIFIED WHETHER SCIATICA PRESENT: ICD-10-CM

## 2025-06-23 PROCEDURE — 72114 X-RAY EXAM L-S SPINE BENDING: CPT
